# Patient Record
Sex: MALE | Race: OTHER
[De-identification: names, ages, dates, MRNs, and addresses within clinical notes are randomized per-mention and may not be internally consistent; named-entity substitution may affect disease eponyms.]

---

## 2017-02-02 ENCOUNTER — HOSPITAL ENCOUNTER (INPATIENT)
Dept: HOSPITAL 72 - EMR | Age: 77
LOS: 4 days | Discharge: HOME | DRG: 872 | End: 2017-02-06
Payer: MEDICARE

## 2017-02-02 VITALS — DIASTOLIC BLOOD PRESSURE: 66 MMHG | SYSTOLIC BLOOD PRESSURE: 144 MMHG

## 2017-02-02 VITALS — SYSTOLIC BLOOD PRESSURE: 110 MMHG | DIASTOLIC BLOOD PRESSURE: 45 MMHG

## 2017-02-02 VITALS — WEIGHT: 200 LBS | HEIGHT: 70 IN | BODY MASS INDEX: 28.63 KG/M2

## 2017-02-02 VITALS — DIASTOLIC BLOOD PRESSURE: 53 MMHG | SYSTOLIC BLOOD PRESSURE: 111 MMHG

## 2017-02-02 DIAGNOSIS — I25.118: ICD-10-CM

## 2017-02-02 DIAGNOSIS — J45.909: ICD-10-CM

## 2017-02-02 DIAGNOSIS — A41.81: Primary | ICD-10-CM

## 2017-02-02 DIAGNOSIS — Z95.5: ICD-10-CM

## 2017-02-02 DIAGNOSIS — B95.5: ICD-10-CM

## 2017-02-02 DIAGNOSIS — N39.0: ICD-10-CM

## 2017-02-02 DIAGNOSIS — R00.0: ICD-10-CM

## 2017-02-02 DIAGNOSIS — J44.1: ICD-10-CM

## 2017-02-02 DIAGNOSIS — E11.9: ICD-10-CM

## 2017-02-02 DIAGNOSIS — Z87.891: ICD-10-CM

## 2017-02-02 DIAGNOSIS — G47.33: ICD-10-CM

## 2017-02-02 DIAGNOSIS — E78.5: ICD-10-CM

## 2017-02-02 DIAGNOSIS — J10.1: ICD-10-CM

## 2017-02-02 DIAGNOSIS — J45.901: ICD-10-CM

## 2017-02-02 DIAGNOSIS — I10: ICD-10-CM

## 2017-02-02 LAB
ALBUMIN/GLOB SERPL: 1.7 {RATIO} (ref 1–2.7)
ALT SERPL-CCNC: 20 U/L (ref 3–41)
ANION GAP SERPL CALC-SCNC: 18 MMOL/L (ref 5–15)
APPEARANCE UR: CLEAR
AST SERPL-CCNC: 26 U/L (ref 5–40)
BACTERIA #/AREA URNS HPF: (no result) /HPF
BASOPHILS NFR BLD AUTO: 0.6 % (ref 0–2)
CALCIUM SERPL-MCNC: 9 MG/DL (ref 8.6–10.2)
CHLORIDE SERPL-SCNC: 95 MEQ/L (ref 98–107)
CK MB SERPL-MCNC: 1.7 NG/ML (ref ?–6.7)
CO2 SERPL-SCNC: 25 MEQ/L (ref 20–30)
CREAT SERPL-MCNC: 1.3 MG/DL (ref 0.7–1.2)
EOSINOPHIL NFR BLD AUTO: 0.7 % (ref 0–3)
ERYTHROCYTE [DISTWIDTH] IN BLOOD BY AUTOMATED COUNT: 12.9 % (ref 11.6–14.8)
GLOBULIN SER-MCNC: 2.5 G/DL
HEMOLYSIS: 61
KETONES UR QL STRIP: NEGATIVE
LEUKOCYTE ESTERASE UR QL STRIP: (no result)
LYMPHOCYTES NFR BLD AUTO: 7.7 % (ref 20–45)
MCH RBC QN AUTO: 26.2 PG (ref 27–31)
MCHC RBC AUTO-ENTMCNC: 31.3 G/DL (ref 32–36)
MCV RBC AUTO: 84 FL (ref 80–99)
MONOCYTES NFR BLD AUTO: 8.3 % (ref 1–10)
NEUTROPHILS NFR BLD AUTO: 82.7 % (ref 45–75)
NITRITE UR QL STRIP: NEGATIVE
PH UR STRIP: 6 [PH] (ref 4.5–8)
PLATELET # BLD: 156 K/UL (ref 150–450)
PMV BLD AUTO: 6.4 FL (ref 6.5–10.1)
POTASSIUM SERPL-SCNC: 4.2 MEQ/L (ref 3.4–4.9)
PROT SERPL-MCNC: 6.9 G/DL (ref 6.6–8.7)
PROT UR QL STRIP: NEGATIVE
RBC # BLD AUTO: 4.99 M/UL (ref 4.7–6.1)
RBC #/AREA URNS HPF: (no result) /HPF (ref 0–0)
SODIUM SERPL-SCNC: 138 MEQ/L (ref 135–145)
SP GR UR STRIP: 1.01 (ref 1–1.03)
SQUAMOUS #/AREA URNS LPF: (no result) /LPF
TROPONIN I SERPL-MCNC: < 0.3 NG/ML (ref ?–0.3)
UROBILINOGEN UR-MCNC: NORMAL MG/DL (ref 0–1)
WBC # BLD AUTO: 8.4 K/UL (ref 4.8–10.8)
WBC #/AREA URNS HPF: (no result) /HPF (ref 0–0)

## 2017-02-02 PROCEDURE — 82803 BLOOD GASES ANY COMBINATION: CPT

## 2017-02-02 PROCEDURE — 81003 URINALYSIS AUTO W/O SCOPE: CPT

## 2017-02-02 PROCEDURE — 36415 COLL VENOUS BLD VENIPUNCTURE: CPT

## 2017-02-02 PROCEDURE — 83880 ASSAY OF NATRIURETIC PEPTIDE: CPT

## 2017-02-02 PROCEDURE — 87086 URINE CULTURE/COLONY COUNT: CPT

## 2017-02-02 PROCEDURE — 86710 INFLUENZA VIRUS ANTIBODY: CPT

## 2017-02-02 PROCEDURE — 94664 DEMO&/EVAL PT USE INHALER: CPT

## 2017-02-02 PROCEDURE — 71010: CPT

## 2017-02-02 PROCEDURE — 93005 ELECTROCARDIOGRAM TRACING: CPT

## 2017-02-02 PROCEDURE — 82553 CREATINE MB FRACTION: CPT

## 2017-02-02 PROCEDURE — 87181 SC STD AGAR DILUTION PER AGT: CPT

## 2017-02-02 PROCEDURE — 36600 WITHDRAWAL OF ARTERIAL BLOOD: CPT

## 2017-02-02 PROCEDURE — 84484 ASSAY OF TROPONIN QUANT: CPT

## 2017-02-02 PROCEDURE — 87205 SMEAR GRAM STAIN: CPT

## 2017-02-02 PROCEDURE — 94640 AIRWAY INHALATION TREATMENT: CPT

## 2017-02-02 PROCEDURE — 94760 N-INVAS EAR/PLS OXIMETRY 1: CPT

## 2017-02-02 PROCEDURE — 87070 CULTURE OTHR SPECIMN AEROBIC: CPT

## 2017-02-02 PROCEDURE — 85025 COMPLETE CBC W/AUTO DIFF WBC: CPT

## 2017-02-02 PROCEDURE — 80053 COMPREHEN METABOLIC PANEL: CPT

## 2017-02-02 RX ADMIN — ALBUTEROL SULFATE SCH MG: 2.5 SOLUTION RESPIRATORY (INHALATION) at 20:09

## 2017-02-02 RX ADMIN — IPRATROPIUM BROMIDE AND ALBUTEROL SULFATE SCH ML: .5; 3 SOLUTION RESPIRATORY (INHALATION) at 23:26

## 2017-02-02 RX ADMIN — Medication SCH MCG: at 20:10

## 2017-02-02 RX ADMIN — ALBUTEROL SULFATE SCH MG: 2.5 SOLUTION RESPIRATORY (INHALATION) at 20:10

## 2017-02-02 RX ADMIN — Medication SCH MCG: at 20:09

## 2017-02-02 NOTE — EMERGENCY ROOM REPORT
History of Present Illness


General


Chief Complaint:  Dyspnea/Respdistress


Source:  Patient, EMS





Present Illness


HPI


76-year-old male presents to ED complaining of shortness of breath.  States 

symptoms started today.  Per EMS patient was wheezing.  Started on breathing 

treatments.  Patient has history of COPD.  Denies any fevers or chills.  Denies 

chest pain.  Denies sick contacts or recent travel.  No other aggravating 

relieving factors.  Denies any other associated symptoms


Allergies:  


Coded Allergies:  


     No Known Allergies (Unverified , 2/19/14)





Patient History


Past Medical History:  HTN, asthma, COPD


Past Surgical History:  none


Pertinent Family History:  none


Social History:  Denies: alcohol use, drug use, smoking


Immunizations:  UTD


Reviewed Nursing Documentation:  PMH: Agreed, PSxH: Agreed





Nursing Documentation-PMH


Past Medical History:  No History, Except For


Hx Cardiac Problems:  Yes


Hx Hypertension:  Yes


Hx Asthma:  Yes


Hx COPD:  Yes - EMPHYSEMA


Hx Cancer:  No


Hx Gastrointestinal Problems:  Yes


Hx Neurological Problems:  No





Review of Systems


All Other Systems:  negative except mentioned in HPI





Physical Exam





Vital Signs








  Date Time  Temp Pulse Resp B/P Pulse Ox O2 Delivery O2 Flow Rate FiO2


 


2/2/17 18:51  100 20 162/80 95 Room Air  


 


2/2/17 20:33 98.9       








Sp02 EP Interpretation:  reviewed, normal


General Appearance:  no apparent distress, alert, GCS 15, non-toxic


Head:  normocephalic


Eyes:  bilateral eye PERRL, bilateral eye normal inspection


ENT:  normal ENT inspection


Neck:  normal inspection


Respiratory:  wheezing


Cardiovascular #1:  regular rate, rhythm, no edema


Gastrointestinal:  normal bowel sounds, non tender, soft, non-distended, no 

guarding, no rebound


Rectal:  deferred


Genitourinary:  no CVA tenderness


Musculoskeletal:  normal inspection


Neurologic:  alert, oriented x3, responsive, motor strength/tone normal, 

sensory intact, speech normal


Psychiatric:  normal inspection


Skin:  normal inspection


Lymphatic:  normal inspection





Medical Decision Making


Diagnostic Impression:  


 Primary Impression:  


 COPD exacerbation


ER Course


Hospital Course 


76-year-old M presenting to ED with SOB.  h/o COPD





Differential diagnoses include: Pneumonia, CHF exacerbation, pneumothorax, 

fluid overload





Clinical course


Patient placed on stretcher.  On cardiac monitor with stable vitals.  After 

initial history and physical, I ordered nebulizer treatments.  I ordered labs, 

IV fluids, EKG, chest x-ray, blood cultures, UA.  





Labs - no leukocytosis noted, hemoglobin/hematocrit stable, electrolytes  okay, 

lactate okay, troponins negative 


CXR - hyperinflated lungs. no infiltrates 





Patient states he does not feel better and wishes to be admitted.  abx given





Case discussed with Dr. Bruno (covering for Carol) and he agreed to the 

patient to his service for further care and support





I feel this is a highly complex case requiring extensive working including EKG/

Rhythm strip, Xray/CT/US, Blood/urine lab work, repeat exams while in ED, and 

administration of strong opiates/narcotics for pain control, admission to 

hospital or close patient follow up.  





Diagnosis - COPD exacerbation 





Patient admitted to telemetry in serious condition





Labs








Test


  2/2/17


20:15


 


White Blood Count


  8.4 K/UL


(4.8-10.8)


 


Red Blood Count


  4.99 M/UL


(4.70-6.10)


 


Hemoglobin


  13.1 G/DL


(14.2-18.0)


 


Hematocrit


  41.8 %


(42.0-52.0)


 


Mean Corpuscular Volume 84 FL (80-99) 


 


Mean Corpuscular Hemoglobin


  26.2 PG


(27.0-31.0)


 


Mean Corpuscular Hemoglobin


Concent 31.3 G/DL


(32.0-36.0)


 


Red Cell Distribution Width


  12.9 %


(11.6-14.8)


 


Platelet Count


  156 K/UL


(150-450)


 


Mean Platelet Volume


  6.4 FL


(6.5-10.1)


 


Neutrophils (%) (Auto)


  82.7 %


(45.0-75.0)


 


Lymphocytes (%) (Auto)


  7.7 %


(20.0-45.0)


 


Monocytes (%) (Auto)


  8.3 %


(1.0-10.0)


 


Eosinophils (%) (Auto)


  0.7 %


(0.0-3.0)


 


Basophils (%) (Auto)


  0.6 %


(0.0-2.0)


 


Urine Color Pale yellow 


 


Urine Appearance Clear 


 


Urine pH 6 (4.5-8.0) 


 


Urine Specific Gravity


  1.010


(1.005-1.035)


 


Urine Protein


  Negative


(NEGATIVE)


 


Urine Glucose (UA)


  Negative


(NEGATIVE)


 


Urine Ketones


  Negative


(NEGATIVE)


 


Urine Occult Blood 4+ (NEGATIVE) 


 


Urine Nitrite


  Negative


(NEGATIVE)


 


Urine Bilirubin


  Negative


(NEGATIVE)


 


Urine Urobilinogen


  Normal MG/DL


(0.0-1.0)


 


Urine Leukocyte Esterase 3+ (NEGATIVE) 


 


Urine RBC


  2-4 /HPF (0 -


0)


 


Urine WBC


  40-60 /HPF (0


- 0)


 


Urine Squamous Epithelial


Cells None /LPF


(NONE/OCC)


 


Urine Bacteria


  Few /HPF


(NONE)


 


Sodium Level


  138 mEQ/L


(135-145)


 


Potassium Level


  4.2 mEQ/L


(3.4-4.9)


 


Chloride Level


  95 mEQ/L


()


 


Carbon Dioxide Level


  25 mEQ/L


(20-30)


 


Anion Gap 18 (5-15) 


 


Blood Urea Nitrogen


  14 mg/dL


(7-23)


 


Creatinine


  1.3 mg/dL


(0.7-1.2)


 


Estimat Glomerular Filtration


Rate  mL/min (>60) 


 


 


Glucose Level


  103 mg/dL


()


 


Calcium Level


  9.0 mg/dL


(8.6-10.2)


 


Total Bilirubin


  0.4 mg/dL


(0.0-1.2)


 


Aspartate Amino Transf


(AST/SGOT) 26 U/L (5-40) 


 


 


Alanine Aminotransferase


(ALT/SGPT) 20 U/L (3-41) 


 


 


Alkaline Phosphatase


  75 U/L


()


 


Creatine Kinase MB


  1.7 ng/mL (<


6.7)


 


Troponin I


  < 0.30 ng/mL


(<=0.30)


 


Pro-B-Type Natriuretic Peptide


  186 pg/mL


(0-450)


 


Total Protein


  6.9 g/dL


(6.6-8.7)


 


Albumin


  4.4 g/dL


(3.5-5.2)


 


Globulin 2.5 g/dL 


 


Albumin/Globulin Ratio 1.7 (1.0-2.7) 








EKG Diagnostic Results


Rate:  normal


Rhythm:  NSR


ST Segments:  no acute changes


ASA given to the pt in ED:  No





Rhythm Strip Diag. Results


EP Interpretation:  yes


Rhythm:  NSR, no PVC's, no ectopy





Chest X-Ray Diagnostic Results


EP Interpretation:  Yes


Findings:  no consolidation, no effusion, no pneumothorax, no acute 

cardiopulmonary disease, other - hyperinflated


Number of Views:  1





Last Vital Signs








  Date Time  Temp Pulse Resp B/P Pulse Ox O2 Delivery O2 Flow Rate FiO2


 


2/2/17 22:16  128 20 111/53 93 Room Air  


 


2/2/17 21:46 98.9       








Status:  unchanged


Disposition:  ADMITTED AS INPATIENT


Condition:  Serious


Referrals:  


MELBA BIRMINGHAM (PCP)











GRETTA LEAL M.D. Feb 2, 2017 22:40

## 2017-02-03 VITALS — SYSTOLIC BLOOD PRESSURE: 109 MMHG | DIASTOLIC BLOOD PRESSURE: 51 MMHG

## 2017-02-03 VITALS — SYSTOLIC BLOOD PRESSURE: 114 MMHG | DIASTOLIC BLOOD PRESSURE: 75 MMHG

## 2017-02-03 VITALS — SYSTOLIC BLOOD PRESSURE: 137 MMHG | DIASTOLIC BLOOD PRESSURE: 75 MMHG

## 2017-02-03 VITALS — DIASTOLIC BLOOD PRESSURE: 70 MMHG | SYSTOLIC BLOOD PRESSURE: 138 MMHG

## 2017-02-03 LAB
ARTERIAL PATENCY WRIST A: POSITIVE
BASE EXCESS STD BLDA CALC-SCNC: -2.4 MMOL/L
PCO2 BLDA: 36.4 MMHG (ref 35–45)

## 2017-02-03 RX ADMIN — ZOLPIDEM TARTRATE PRN MG: 5 TABLET ORAL at 01:06

## 2017-02-03 RX ADMIN — OSELTAMIVIR PHOSPHATE SCH MG: 75 CAPSULE ORAL at 17:19

## 2017-02-03 RX ADMIN — ATORVASTATIN CALCIUM SCH MG: 20 TABLET, FILM COATED ORAL at 21:25

## 2017-02-03 RX ADMIN — SODIUM CHLORIDE SCH MLS/HR: 0.9 INJECTION INTRAVENOUS at 00:26

## 2017-02-03 RX ADMIN — METHYLPREDNISOLONE SODIUM SUCCINATE SCH MG: 125 INJECTION, POWDER, FOR SOLUTION INTRAMUSCULAR; INTRAVENOUS at 05:36

## 2017-02-03 RX ADMIN — OSELTAMIVIR PHOSPHATE SCH MG: 75 CAPSULE ORAL at 10:07

## 2017-02-03 RX ADMIN — METHYLPREDNISOLONE SODIUM SUCCINATE SCH MG: 125 INJECTION, POWDER, FOR SOLUTION INTRAMUSCULAR; INTRAVENOUS at 13:30

## 2017-02-03 RX ADMIN — HEPARIN SODIUM SCH UNITS: 5000 INJECTION INTRAVENOUS; SUBCUTANEOUS at 21:30

## 2017-02-03 RX ADMIN — METHYLPREDNISOLONE SODIUM SUCCINATE SCH MG: 125 INJECTION, POWDER, FOR SOLUTION INTRAMUSCULAR; INTRAVENOUS at 21:24

## 2017-02-03 RX ADMIN — LOSARTAN POTASSIUM AND HYDROCHLOROTHIAZIDE SCH TAB: 12.5; 5 TABLET ORAL at 09:11

## 2017-02-03 RX ADMIN — IPRATROPIUM BROMIDE AND ALBUTEROL SULFATE SCH ML: .5; 3 SOLUTION RESPIRATORY (INHALATION) at 11:34

## 2017-02-03 RX ADMIN — IPRATROPIUM BROMIDE AND ALBUTEROL SULFATE SCH ML: .5; 3 SOLUTION RESPIRATORY (INHALATION) at 02:08

## 2017-02-03 RX ADMIN — IPRATROPIUM BROMIDE AND ALBUTEROL SULFATE SCH ML: .5; 3 SOLUTION RESPIRATORY (INHALATION) at 07:45

## 2017-02-03 RX ADMIN — IPRATROPIUM BROMIDE AND ALBUTEROL SULFATE SCH ML: .5; 3 SOLUTION RESPIRATORY (INHALATION) at 16:07

## 2017-02-03 RX ADMIN — HEPARIN SODIUM SCH UNITS: 5000 INJECTION INTRAVENOUS; SUBCUTANEOUS at 09:14

## 2017-02-03 RX ADMIN — FLUTICASONE PROPIONATE AND SALMETEROL SCH PUFFS: 50; 250 POWDER RESPIRATORY (INHALATION) at 19:18

## 2017-02-03 RX ADMIN — IPRATROPIUM BROMIDE AND ALBUTEROL SULFATE SCH ML: .5; 3 SOLUTION RESPIRATORY (INHALATION) at 23:41

## 2017-02-03 RX ADMIN — IPRATROPIUM BROMIDE AND ALBUTEROL SULFATE SCH ML: .5; 3 SOLUTION RESPIRATORY (INHALATION) at 19:18

## 2017-02-03 NOTE — DIAGNOSTIC IMAGING REPORT
Indication: SOB



Technique: One view of the chest



Comparison: 10/18/2016



Findings: There is atelectasis or scarring at the left lung base. Heart size is

normal. Aorta is tortuous and calcified.



Impression: No acute process



This agrees with the preliminary interpretation provided by the emergency room

physician

## 2017-02-03 NOTE — CONSULTATION
DATE OF CONSULTATION:



PULMONARY CONSULTATION



This dictation is for coverage for Dr. Joe Arthur.



CONSULTING PHYSICIAN:  Chan Ramos M.D.



REFERRING PHYSICIAN:  Austin Bruno M.D.



HISTORY OF PRESENT ILLNESS:  The patient is a 76-year-old male former

smoker with a history of chronic obstructive pulmonary disease,

hypertension, CAD, prior myocardial infarction, hyperlipidemia, diabetes,

CHINMAY, and noncompliance, who presented to the ER overnight with complaints

of shortness of breath and wheezing for a day.  He was given breathing

treatments in the ER and admitted for chronic obstructive pulmonary

disease exacerbation.  His rapid flu came back as positive for influenza A

and he has been started on Tamiflu.  The patient has a coarse cough with

difficulty mobilizing, associated wheezing, and some rhinorrhea.  No

congestion.  No fevers or chills.  No nausea, vomiting, diarrhea,

constipation, abdominal pain, or urinary complaints.



PAST MEDICAL HISTORY:

1. Chronic obstructive pulmonary disease.

2. CHINMAY.

3. CAD status post PCI.

4. Hypertension.

5. Hyperlipidemia.

6. Diabetes.

7. History of noncompliance with treatment.



MEDICATIONS:  Prior to admission medications, albuterol, Xanax,

captopril, Restasis, Nexium, Lasix, losartan, hydrochlorothiazide,

lorazepam, Reglan, Crestor, Advair, VESIcare, and Ambien.



ALLERGIES:  No known drug allergies.



SOCIAL HISTORY:  He is a former smoker.  No tobacco, alcohol, or drug

use.  He is retired.  He has good family support.



FAMILY HISTORY:  Noncontributory.



REVIEW OF SYSTEMS:  Negative other than history of present illness.



PHYSICAL EXAMINATION:

VITAL SIGNS:  Temperature 98.2 degrees, pulse 100, blood pressure

140/79, respiratory rate 20, and saturating 92% on two liters. liters.

GENERAL:  This is an elderly male, in no acute distress.  Awake,

alert, and oriented x3.

HEENT:  Normocephalic and atraumatic.  Oropharynx is clear with moist

mucous membranes.

NECK:  Supple without lymphadenopathy or jugular venous distention.

CHEST:  Scattered coarse breath sounds with rhonchi and faint

end-expiratory wheezing.

HEART:  Regular rate and rhythm.

ABDOMEN:  Soft and nontender.

EXTREMITIES:  No cyanosis, clubbing, or edema.



ANCILLARY DATA:  Hemoglobin 8, hematocrit 13.1, and platelet count

156,000.  Sodium 138, potassium 4.2, chloride 95, bicarbonate 25, BUN 14,

creatinine 1.3, glucose 103, and calcium 9.0.  Total bilirubin 0.4.  AST

26, ALT 26, and alkaline phosphatase 75.  Troponin negative.  .

Total protein 6.9.  Albumin 4.9.  Globulin 2.5.  Urinalysis, 4+ blood and

3+ leukocyte esterase.  Urine culture pending.  Rapid influenza positive

for influenza A.  Chest x-ray is not available for review, but per report,

hyperinflation.  No infiltrates.



ASSESSMENT:  The patient is a 76-year-old male former smoker with a

history of chronic obstructive pulmonary disease, coronary artery disease

status post percutaneous coronary intervention, hypertension,

hyperlipidemia, chronic kidney disease, diabetes, untreated obstructive

sleep apnea, and medication noncompliance, presenting with an exacerbation

of his chronic obstructive pulmonary disease likely brought by an

influenza A upper respiratory infection/pneumonitis.



PROBLEM LIST:

1. Influenza A upper respiratory infection/pneumonitis.

2. Chronic obstructive pulmonary disease with acute exacerbation.

3. Urinary tract infection.

4. Coronary artery disease status post percutaneous coronary

intervention.

5. Hypertension.

6. Hyperlipidemia.

7. Diabetes.

8. Obstructive sleep apnea, untreated.

9. History of noncompliance.

10. Former tobacco use.



TREATMENT PLAN:

1. Optimize pulmonary hygiene and mobilize as tolerated.

2. Continue Advair.

3. Round-the-clock and p.r.n. DuoNebs.

4. Solu-Medrol 60 IV t.i.d. and taper based on response.

5. Tamiflu (today is day #1).

6. Levaquin (today is day #2).

7. Follow sputum culture and urine culture.

8. Monitor volumes/continue p.o. Lasix.

9. Heparin subcutaneous for DVT prophylaxis.



Dr. Bruno, thank you for allowing us to assist in the care of your

patient.  If we may be of any assistance in the future, please do not

hesitate to ask.









  ______________________________________________

  Chan Ramos M.D. DR:  KRISTI

D:  02/03/2017 13:02

T:  02/03/2017 18:03

JOB#:  [id number]

CC:

## 2017-02-03 NOTE — HISTORY AND PHYSICAL REPORT
DATE OF ADMISSION:  02/02/2017



CHIEF COMPLAINT:  Chronic obstructive pulmonary disease

exacerbation.



HISTORY OF PRESENT ILLNESS:  The patient is an 76-year-old male.  He

has a history of chronic obstructive pulmonary disease, asthma, chronic

bronchitis, hypertension who presented from home with complaints of one

week of continued shortness of breath.  On evaluation in the emergency

room, the patient was diagnosed with chronic obstructive pulmonary disease

exacerbation.  He was initially on BiPAP.  He received multiple breathing

treatments as well steroids but did not improve significantly.  He is

therefore admitted for further evaluation and care.



PAST MEDICAL HISTORY:  As above.



PAST SURGICAL HISTORY:  None.



CURRENT MEDICATIONS:  Reconciled and reviewed.



ALLERGIES:  None.



SOCIAL HISTORY:  The patient is a prior smoker but quit.  He denies

alcohol or drugs.



FAMILY HISTORY:  None.



REVIEW OF SYSTEMS:  General:  No fever or chills.    HEENT:  No

headaches or visual changes.  Cardiopulmonary:  No chest pain.  Positive

shortness of breath.  Gastrointestinal:  No nausea or vomiting.

Genitourinary:  No urgency or frequency.    Musculoskeletal:  No dependent

swelling.    Neurologic:  No history of seizures.



PHYSICAL EXAMINATION:

VITAL SIGNS:  Temperature 98.2 degrees, blood pressure 114/75, pulse

of 103, respirations 20, O2 saturation 92% on two liters nasal cannula.

 

GENERAL:  The patient is a well-developed male, in no apparent

distress. He is awake and alert, appears to be in no distress.

HEENT:  Oropharynx is clear.

NECK:  Supple.

HEART:  Regular rate and rhythm.

LUNGS:  Significant for __________ breath sounds with scattered

wheezes.

ABDOMEN:  Soft, nontender, and nondistended.

EXTREMITIES:  Without clubbing, cyanosis, or edema.



LABORATORY AND DIAGNOSTIC DATA:  White count of 8, hemoglobin 13,

hematocrit 41, and platelets of 156,000.  Sodium 138, creatinine was 1.3.

Urine showed 40 to 60 WBCs. Influenza A is positive.



ASSESSMENT:  This is a pleasant male admitted with complaints of

chronic obstructive pulmonary disease exacerbation.  He also has influenza

and urinary tract infection.



PLAN:

1. Intravenous steroids.

2. Respiratory treatments.

3. Pulmonary and ID consultation.

4. Start Tamiflu for influenza.

5. IV antibiotics for urinary tract infection.

6. Followup cultures.









  ______________________________________________

  Austin Bruno M.D.





DR:  Bekah

D:  02/03/2017 09:43

T:  02/03/2017 15:15

JOB#:  [id number]

CC:

## 2017-02-04 VITALS — SYSTOLIC BLOOD PRESSURE: 130 MMHG | DIASTOLIC BLOOD PRESSURE: 69 MMHG

## 2017-02-04 VITALS — DIASTOLIC BLOOD PRESSURE: 69 MMHG | SYSTOLIC BLOOD PRESSURE: 125 MMHG

## 2017-02-04 VITALS — DIASTOLIC BLOOD PRESSURE: 78 MMHG | SYSTOLIC BLOOD PRESSURE: 154 MMHG

## 2017-02-04 VITALS — DIASTOLIC BLOOD PRESSURE: 70 MMHG | SYSTOLIC BLOOD PRESSURE: 129 MMHG

## 2017-02-04 VITALS — SYSTOLIC BLOOD PRESSURE: 135 MMHG | DIASTOLIC BLOOD PRESSURE: 77 MMHG

## 2017-02-04 RX ADMIN — HEPARIN SODIUM SCH UNITS: 5000 INJECTION INTRAVENOUS; SUBCUTANEOUS at 09:10

## 2017-02-04 RX ADMIN — IPRATROPIUM BROMIDE AND ALBUTEROL SULFATE SCH ML: .5; 3 SOLUTION RESPIRATORY (INHALATION) at 23:12

## 2017-02-04 RX ADMIN — IPRATROPIUM BROMIDE AND ALBUTEROL SULFATE SCH ML: .5; 3 SOLUTION RESPIRATORY (INHALATION) at 15:11

## 2017-02-04 RX ADMIN — IPRATROPIUM BROMIDE AND ALBUTEROL SULFATE SCH ML: .5; 3 SOLUTION RESPIRATORY (INHALATION) at 20:25

## 2017-02-04 RX ADMIN — PREDNISONE SCH MG: 20 TABLET ORAL at 09:07

## 2017-02-04 RX ADMIN — DIPHENHYDRAMINE HYDROCHLORIDE PRN MG: 50 INJECTION INTRAMUSCULAR; INTRAVENOUS at 18:32

## 2017-02-04 RX ADMIN — IPRATROPIUM BROMIDE AND ALBUTEROL SULFATE SCH ML: .5; 3 SOLUTION RESPIRATORY (INHALATION) at 03:41

## 2017-02-04 RX ADMIN — IPRATROPIUM BROMIDE AND ALBUTEROL SULFATE SCH ML: .5; 3 SOLUTION RESPIRATORY (INHALATION) at 11:30

## 2017-02-04 RX ADMIN — DOCUSATE SODIUM SCH MG: 100 CAPSULE, LIQUID FILLED ORAL at 09:08

## 2017-02-04 RX ADMIN — DOCUSATE SODIUM SCH MG: 100 CAPSULE, LIQUID FILLED ORAL at 18:17

## 2017-02-04 RX ADMIN — SODIUM CHLORIDE SCH MLS/HR: 0.9 INJECTION INTRAVENOUS at 00:58

## 2017-02-04 RX ADMIN — FLUTICASONE PROPIONATE AND SALMETEROL SCH PUFFS: 50; 250 POWDER RESPIRATORY (INHALATION) at 20:25

## 2017-02-04 RX ADMIN — LOSARTAN POTASSIUM AND HYDROCHLOROTHIAZIDE SCH TAB: 12.5; 5 TABLET ORAL at 09:08

## 2017-02-04 RX ADMIN — HEPARIN SODIUM SCH UNITS: 5000 INJECTION INTRAVENOUS; SUBCUTANEOUS at 21:21

## 2017-02-04 RX ADMIN — ASPIRIN SCH MG: 81 TABLET, DELAYED RELEASE ORAL at 09:09

## 2017-02-04 RX ADMIN — OSELTAMIVIR PHOSPHATE SCH MG: 75 CAPSULE ORAL at 09:08

## 2017-02-04 RX ADMIN — OSELTAMIVIR PHOSPHATE SCH MG: 75 CAPSULE ORAL at 18:17

## 2017-02-04 RX ADMIN — ZOLPIDEM TARTRATE PRN MG: 5 TABLET ORAL at 00:58

## 2017-02-04 RX ADMIN — ATORVASTATIN CALCIUM SCH MG: 20 TABLET, FILM COATED ORAL at 21:21

## 2017-02-04 RX ADMIN — IPRATROPIUM BROMIDE AND ALBUTEROL SULFATE SCH ML: .5; 3 SOLUTION RESPIRATORY (INHALATION) at 07:30

## 2017-02-04 NOTE — PULMONOLOGY PROGRESS NOTE
Assessment/Plan


Assessment/Plan


PROBLEM LIST:


1. Influenza A upper respiratory infection/pneumonitis.


2. Chronic obstructive pulmonary disease with acute exacerbation.


3. Urinary tract infection.


4. Coronary artery disease status post percutaneous coronary


intervention.


5. Hypertension.


6. Hyperlipidemia.


7. Diabetes.


8. Obstructive sleep apnea, untreated.


9. History of noncompliance.


10. Former tobacco use.





TREATMENT PLAN:


1. Optimize pulmonary hygiene and mobilize as tolerated.


2. Continue Advair.


3. Round-the-clock and p.r.n. DuoNebs.


4. Solu-Medrol 60 IV t.i.d. and taper in am to q 12 if stable


5. Tamiflu 


6. Levaquin 


7. Follow sputum culture and urine culture.


8. Monitor volumes/continue p.o. Lasix.


9. Heparin subcutaneous for DVT prophylaxis.





Subjective


Constitutional:  Reports: no symptoms


HEENT:  Repors: no symptoms


Respiratory:  Reports: productive cough, shortness of breath


Cardiovascular:  Reports: no symptoms


Gastrointestinal/Abdominal:  Reports: no symptoms


Genitourinary:  Reports: no symptoms


Skin:  Reports: no symptoms


Hematologic:  Reports: no symptoms


Allergies:  


Coded Allergies:  


     No Known Allergies (Unverified , 2/19/14)


Subjective


still with sob


no bleeding


on o2


cough with out phelgm


oob but weak


tolerating some po





Objective





Last 24 Hour Vital Signs








  Date Time  Temp Pulse Resp B/P Pulse Ox O2 Delivery O2 Flow Rate FiO2


 


2/4/17 19:12  76 18  99 Nasal Cannula 2.0 28 2/4/17 19:05  76 18  92 Room Air  


 


2/4/17 19:05     92 Room Air  21


 


2/4/17 19:05      Room Air  


 


2/4/17 16:00 97.7 86 18 129/70 98 Room Air  


 


2/4/17 12:00 97.9 76 20 125/69 100 Simple Mask  


 


2/4/17 11:35  80 20  99 Nasal Cannula 2.0 28 2/4/17 11:30  77 18  99 Nasal Cannula 2.0 28 2/4/17 09:08    135/77    


 


2/4/17 08:00 97.0 75 20 135/77 95 Room Air  


 


2/4/17 08:00  79      


 


2/4/17 07:35  78 20  99 Room Air  


 


2/4/17 07:30      Nasal Cannula 2.0 28 2/4/17 07:30     95 Room Air  21


 


2/4/17 07:30  75 18  95 Room Air  


 


2/4/17 04:30 98.0 80 20 154/78 99 Nasal Cannula 2.0 


 


2/4/17 03:59  79      


 


2/4/17 03:30  81 18  99 Nasal Cannula 3.0 


 


2/4/17 03:30  80 18  97 Nasal Cannula 3.0 


 


2/3/17 23:42  87      


 


2/3/17 23:25  84 18  99 Nasal Cannula 3.0 


 


2/3/17 23:25  89 18  96 Nasal Cannula 3.0 

















Intake and Output  


 


 2/3/17 2/4/17





 19:00 07:00


 


Intake Total 320 ml 55 ml


 


Balance 320 ml 55 ml


 


  


 


Intake Oral 120 ml 


 


IV Total  55 ml


 


Other 200 ml 


 


# Voids 3 2








General Appearance:  WD/WN


HEENT:  PERRL


Respiratory/Chest:  rhonchi


Cardiovascular:  normal rate, regular rhythm


Abdomen:  soft, non tender, no organomegaly


Extremities:  no cyanosis


Skin:  no rash





Microbiology








 Date/Time


Source Procedure


Growth Status


 


 


 2/3/17 05:15


Nasal Nares Influenza Types A,B Antigen (GUANACO) - Final Complete


 


 2/2/17 20:15


Urine,Clean Catch Urine Culture - Preliminary


Strep Species, Gamma-Hemolytic Resulted











Current Medications








 Medications


  (Trade)  Dose


 Ordered  Sig/Kyara


 Route


 PRN Reason  Start Time


 Stop Time Status Last Admin


Dose Admin


 


 Acetaminophen


  (Tylenol)  500 mg  Q4H  PRN


 ORAL


 Mild Pain/Temp > 100.5  2/2/17 22:00


 3/4/17 21:59   


 


 


 Albuterol/


 Ipratropium 3 ml  3 ml  Q4HRT


 HHN


   2/2/17 23:00


 2/7/17 22:59  2/4/17 20:25


 


 


 Alprazolam


  (Xanax)  0.5 mg  DAILY  PRN


 ORAL


 Agitation  2/2/17 22:00


 2/9/17 21:59   


 


 


 Aspirin


  (Ecotrin)  81 mg  DAILY


 ORAL


   2/4/17 09:00


 3/6/17 08:59  2/4/17 09:09


 


 


 Atorvastatin


 Calcium


  (Lipitor)  20 mg  BEDTIME


 ORAL


   2/3/17 21:00


 3/5/17 20:59  2/3/17 21:25


 


 


 Ceftriaxone


 Sodium/Dextrose


  (Rocephin/D5W)  55 ml @ 


 110 mls/hr  Q24H


 IVPB


   2/3/17 00:00


 2/10/17 00:00  2/4/17 00:58


 


 


 Diphenhydramine


 HCl


  (Benadryl)  25 mg  EVERY 4 HOURS  PRN


 IVP


 Itching  2/4/17 19:00


 3/6/17 18:59  2/4/17 18:32


 


 


 Docusate Sodium


  (Colace)  100 mg  TWICE A  DAY


 ORAL


   2/4/17 09:00


 3/6/17 08:59  2/4/17 18:17


 


 


 HCTZ/Losartan


 Potassium


  (Hyzaar 50-12.5)  1 tab  DAILY


 ORAL


   2/3/17 09:00


 3/5/17 08:59  2/4/17 09:08


 


 


 Heparin Sodium


  (Porcine)


  (Heparin 5000


 units/ml)  5,000 units  EVERY 12  HOURS


 SUBQ


   2/3/17 09:00


 3/5/17 08:59  2/4/17 09:10


 


 


 Metoclopramide HCl


  (Reglan)  10 mg  Q6H  PRN


 ORAL


 Nausea & Vomiting  2/2/17 22:00


 3/4/17 21:59   


 


 


 Oseltamivir


 Phosphate


  (Tamiflu)  75 mg  TWICE A  DAY


 ORAL


   2/3/17 10:30


 2/8/17 10:29  2/4/17 18:17


 


 


 Pantoprazole


  (Protonix)  40 mg  ACBREAKFAST


 ORAL


   2/4/17 06:30


 3/5/17 08:59  2/4/17 07:00


 


 


 Polyethylene


 Glycol


  (Miralax)  17 gm  DAILYPRN  PRN


 ORAL


 Constipation  2/4/17 07:00


 3/6/17 06:59   


 


 


 Prednisone


  (predniSONE)  30 mg  DAILY


 ORAL


   2/4/17 09:00


 3/6/17 08:59  2/4/17 09:07


 


 


 Promethazine HCl/


 Codeine


  (Phenergan with


 Codeine)  10 ml  TIDPRN  PRN


 ORAL


 For Cough  2/4/17 07:00


 3/6/17 06:59   


 


 


 Salmeterol


 Xinafoate/


 Fluticasone


  (Advair 250/50


 Diskus)  1 puffs  BEDTIME


 INH


   2/3/17 21:00


 3/5/17 20:59  2/4/17 20:25


 


 


 Solifenacin


  (Vesicare)  5 mg  BEDTIME


 ORAL


   2/3/17 21:00


 3/5/17 20:59  2/3/17 21:25


 


 


 Zolpidem Tartrate


  (Ambien)  5 mg  BEDTIME  PRN


 ORAL


 Insomnia  2/2/17 22:00


 3/4/17 21:59  2/4/17 00:58


 











Current Medications








 Medications


  (Trade)  Dose


 Ordered  Sig/Kyara


 Route


 PRN Reason  Start Time


 Stop Time Status Last Admin


Dose Admin


 


 Acetaminophen


  (Tylenol)  500 mg  Q4H  PRN


 ORAL


 Mild Pain/Temp > 100.5  2/2/17 22:00


 3/4/17 21:59   


 


 


 Albuterol/


 Ipratropium 3 ml  3 ml  Q4HRT


 HHN


   2/2/17 23:00


 2/7/17 22:59  2/4/17 20:25


 


 


 Alprazolam


  (Xanax)  0.5 mg  DAILY  PRN


 ORAL


 Agitation  2/2/17 22:00


 2/9/17 21:59   


 


 


 Aspirin


  (Ecotrin)  81 mg  DAILY


 ORAL


   2/4/17 09:00


 3/6/17 08:59  2/4/17 09:09


 


 


 Atorvastatin


 Calcium


  (Lipitor)  20 mg  BEDTIME


 ORAL


   2/3/17 21:00


 3/5/17 20:59  2/3/17 21:25


 


 


 Ceftriaxone


 Sodium/Dextrose


  (Rocephin/D5W)  55 ml @ 


 110 mls/hr  Q24H


 IVPB


   2/3/17 00:00


 2/10/17 00:00  2/4/17 00:58


 


 


 Diphenhydramine


 HCl


  (Benadryl)  25 mg  EVERY 4 HOURS  PRN


 IVP


 Itching  2/4/17 19:00


 3/6/17 18:59  2/4/17 18:32


 


 


 Docusate Sodium


  (Colace)  100 mg  TWICE A  DAY


 ORAL


   2/4/17 09:00


 3/6/17 08:59  2/4/17 18:17


 


 


 HCTZ/Losartan


 Potassium


  (Hyzaar 50-12.5)  1 tab  DAILY


 ORAL


   2/3/17 09:00


 3/5/17 08:59  2/4/17 09:08


 


 


 Heparin Sodium


  (Porcine)


  (Heparin 5000


 units/ml)  5,000 units  EVERY 12  HOURS


 SUBQ


   2/3/17 09:00


 3/5/17 08:59  2/4/17 09:10


 


 


 Metoclopramide HCl


  (Reglan)  10 mg  Q6H  PRN


 ORAL


 Nausea & Vomiting  2/2/17 22:00


 3/4/17 21:59   


 


 


 Oseltamivir


 Phosphate


  (Tamiflu)  75 mg  TWICE A  DAY


 ORAL


   2/3/17 10:30


 2/8/17 10:29  2/4/17 18:17


 


 


 Pantoprazole


  (Protonix)  40 mg  ACBREAKFAST


 ORAL


   2/4/17 06:30


 3/5/17 08:59  2/4/17 07:00


 


 


 Polyethylene


 Glycol


  (Miralax)  17 gm  DAILYPRN  PRN


 ORAL


 Constipation  2/4/17 07:00


 3/6/17 06:59   


 


 


 Prednisone


  (predniSONE)  30 mg  DAILY


 ORAL


   2/4/17 09:00


 3/6/17 08:59  2/4/17 09:07


 


 


 Promethazine HCl/


 Codeine


  (Phenergan with


 Codeine)  10 ml  TIDPRN  PRN


 ORAL


 For Cough  2/4/17 07:00


 3/6/17 06:59   


 


 


 Salmeterol


 Xinafoate/


 Fluticasone


  (Advair 250/50


 Diskus)  1 puffs  BEDTIME


 INH


   2/3/17 21:00


 3/5/17 20:59  2/4/17 20:25


 


 


 Solifenacin


  (Vesicare)  5 mg  BEDTIME


 ORAL


   2/3/17 21:00


 3/5/17 20:59  2/3/17 21:25


 


 


 Zolpidem Tartrate


  (Ambien)  5 mg  BEDTIME  PRN


 ORAL


 Insomnia  2/2/17 22:00


 3/4/17 21:59  2/4/17 00:58


 

















ISAI GOMEZ DO Feb 4, 2017 20:58

## 2017-02-04 NOTE — CONSULTATION
DATE OF CONSULTATION:



CARDIOLOGY CONSULTATION:



REQUESTING PHYSICIAN:  Austin Bruno M.D.



REASON FOR CONSULTATION:  Tachycardia in the setting of coronary

artery disease _________ .



HISTORY OF PRESENT ILLNESS:  The patient is a 76-year-old male who

presented to the emergency room last evening with shortness of breath.  He

has a known history of COPD.  He was taking breathing treatments at home,

but did not improve.  He has had a  moderate amount of congestion as well

as body aches and fever.



PAST MEDICAL HISTORY:  Hypertension, COPD, coronary artery disease,

and history of coronary stent.



MEDICATIONS:  Prior to admission, reviewed and reconciled.



ALLERGIES:  None known.



SOCIAL HISTORY:  Denies smoking or alcohol use.



REVIEW OF SYSTEMS:  Denies leg swelling.  Denies chest pain.  Denies

history of irregular heartbeats or endocarditis.  No known history of

blood clotting.



PHYSICAL EXAMINATION:

VITAL SIGNS:  Afebrile, blood pressure 111/53, pulse 128, and

respiratory rate 20.

HEENT:  Conjunctivae are pink.  Sclerae are anicteric.  Oropharynx

clear.

NECK:  Supple.  Accessory muscle use noted.

LUNGS:  With bilateral breath sounds.  Scattered rhonchi.  Expiratory

wheezes.

CARDIAC:  Regular rhythm.  Rapid rate.  Normal S1, S2 with a fourth

heart sound.

ABDOMEN:  Soft and nontender.

EXTREMITIES:  Good capillary refill.  No edema.



LABORATORY AND DIAGNOSTIC DATA:  Pro-natriuretic peptide 186.  BUN

14,  creatinine 1.3, sodium 138, potassium 4.2, and bicarbonate 25.

Urinalysis with 40 to 60 white cells.  White blood count is 8.4 and

hemoglobin 13.1.  EKG with sinus rhythm and no acute abnormalities.  Chest

x-ray with  hyperinflation, but no acute process.



IMPRESSION:

1. Chronic obstructive pulmonary disease exacerbation.

2. Acute bronchospasm.

3. Influenza A infection.

4. Secondary sinus tachycardia.

5. Ischemic heart disease with history of coronary stents and chronic

stable angina.

6. Urinary tract infection.



PLAN:  Cardiac monitoring.  Respiratory isolation.  Tamiflu.  IV

fluids with caution. Inhaled bronchodilators.  Intravenous steroids. DVT

prophylaxis with subcutaneous heparin. Anti-platelet therapy with aspirin.

Statin drug for LDL goal less than 100.  Hold diuretics at this time.

Maintain baseline antihypertensive regimen with losartan.









  ______________________________________________

  David Richards M.D.





DR:  Alireza

D:  02/03/2017 23:27

T:  02/04/2017 01:36

JOB#:  [id number]

CC:

## 2017-02-04 NOTE — GENERAL PROGRESS NOTE
Assessment/Plan


Problem List:  


(1) Constipation


ICD Codes:  K59.00 - Constipation, unspecified


SNOMED:  57747443


(2) Influenza A


ICD Codes:  J10.1 - Influenza due to other identified influenza virus with 

other respiratory manifestations


SNOMED:  025762683


(3) COPD (chronic obstructive pulmonary disease)


ICD Codes:  J44.9 - Chronic obstructive pulmonary disease, unspecified


SNOMED:  78861800


(4) Asthma exacerbation


ICD Codes:  J45.901 - Unspecified asthma with (acute) exacerbation


SNOMED:  777694634


Status:  stable, progressing


Assessment/Plan


tamiflu


dc iv steroids- Po


iv abx


laxatives


dc planning next 24-48hrs if continued improvement





Subjective


ROS Limited/Unobtainable:  No


Constitutional:  Reports: no symptoms


HEENT:  Reports: no symptoms


Cardiovascular:  Reports: no symptoms


Respiratory:  Reports: cough


Gastrointestinal/Abdominal:  Reports: constipated


Genitourinary:  Reports: no symptoms


Neurologic/Psychiatric:  Reports: no symptoms


Endocrine:  Reports: no symptoms


Hematologic/Lymphatic:  Reports: no symptoms


Allergies:  


Coded Allergies:  


     No Known Allergies (Unverified , 2/19/14)


All Systems:  reviewed and negative except above


Subjective


c/o cough. no sob. no chest pain


no bm x 3 days.





Objective





Last 24 Hour Vital Signs








  Date Time  Temp Pulse Resp B/P Pulse Ox O2 Delivery O2 Flow Rate FiO2


 


2/4/17 04:30 98.0 80 20 154/78 99 Nasal Cannula 2.0 


 


2/4/17 03:30  81 18  99 Nasal Cannula 3.0 


 


2/4/17 03:30  80 18  97 Nasal Cannula 3.0 


 


2/3/17 23:42  87      


 


2/3/17 23:25  84 18  99 Nasal Cannula 3.0 


 


2/3/17 23:25  89 18  96 Nasal Cannula 3.0 


 


2/3/17 20:17 98.4 95 20 138/70 97 Room Air  


 


2/3/17 20:00  107      


 


2/3/17 19:21  93 18  98 Nasal Cannula 3.0 


 


2/3/17 19:20  93 18  98 Nasal Cannula 3.0 


 


2/3/17 19:20     98 Nasal Cannula 2.0 


 


2/3/17 19:20      Nasal Cannula 2.0 


 


2/3/17 16:43 98.2 90 20 137/75 97 Nasal Cannula 3.0 


 


2/3/17 16:00  92      


 


2/3/17 15:50  94 20  97 Nasal Cannula 3.0 


 


2/3/17 15:40  91 18  95 Nasal Cannula 3.0 


 


2/3/17 11:42  97 20  96 Nasal Cannula 3.0 


 


2/3/17 11:28  94 20  92 Nasal Cannula 2.0 


 


2/3/17 09:11    143/79    


 


2/3/17 08:07  101      


 


2/3/17 07:47  96 20  94 Nasal Cannula 2.0 


 


2/3/17 07:40      Nasal Cannula 2.0 


 


2/3/17 07:39     93 Nasal Cannula 2.0 


 


2/3/17 07:37  93 20  92 Nasal Cannula 2.0 

















Intake and Output  


 


 2/3/17 2/4/17





 19:00 07:00


 


Intake Total 320 ml 


 


Balance 320 ml 


 


  


 


Intake Oral 120 ml 


 


Other 200 ml 


 


# Voids 3 2








Laboratory Tests


2/3/17 12:49: 


Arterial Blood pH 7.390, Arterial Blood Partial Pressure CO2 36.4, Arterial 

Blood Partial Pressure O2 76.5, Arterial Blood HCO3 21.9L, Arterial Blood 

Oxygen Saturation 94.9, Arterial Blood Base Excess -2.4, Tray Test Positive


Height (Feet):  5


Height (Inches):  10.00


Weight (Pounds):  200


General Appearance:  WD/WN, alert


Neck:  supple


Cardiovascular:  normal rate, regular rhythm


Respiratory/Chest:  lungs clear


Abdomen:  normal bowel sounds, non tender, soft, no organomegaly


Edema:  no edema noted Arm (L), no edema noted Arm (R), no edema noted Leg (L), 

no edema noted Leg (R), no edema noted Pedal (L), no edema noted Pedal (R), no 

edema noted Generalized


Neurologic:  alert, responsive











JANE SANCHEZ Feb 4, 2017 06:56

## 2017-02-05 VITALS — DIASTOLIC BLOOD PRESSURE: 68 MMHG | SYSTOLIC BLOOD PRESSURE: 125 MMHG

## 2017-02-05 VITALS — DIASTOLIC BLOOD PRESSURE: 76 MMHG | SYSTOLIC BLOOD PRESSURE: 117 MMHG

## 2017-02-05 VITALS — SYSTOLIC BLOOD PRESSURE: 125 MMHG | DIASTOLIC BLOOD PRESSURE: 65 MMHG

## 2017-02-05 VITALS — DIASTOLIC BLOOD PRESSURE: 79 MMHG | SYSTOLIC BLOOD PRESSURE: 136 MMHG

## 2017-02-05 VITALS — SYSTOLIC BLOOD PRESSURE: 121 MMHG | DIASTOLIC BLOOD PRESSURE: 71 MMHG

## 2017-02-05 VITALS — DIASTOLIC BLOOD PRESSURE: 71 MMHG | SYSTOLIC BLOOD PRESSURE: 133 MMHG

## 2017-02-05 LAB
ALBUMIN/GLOB SERPL: 1.5 {RATIO} (ref 1–2.7)
ALT SERPL-CCNC: 20 U/L (ref 3–41)
ANION GAP SERPL CALC-SCNC: 14 MMOL/L (ref 5–15)
AST SERPL-CCNC: 23 U/L (ref 5–40)
BASOPHILS NFR BLD AUTO: 0.4 % (ref 0–2)
CALCIUM SERPL-MCNC: 8.9 MG/DL (ref 8.6–10.2)
CHLORIDE SERPL-SCNC: 96 MEQ/L (ref 98–107)
CO2 SERPL-SCNC: 28 MEQ/L (ref 20–30)
CREAT SERPL-MCNC: 1.5 MG/DL (ref 0.7–1.2)
EOSINOPHIL NFR BLD AUTO: 0.1 % (ref 0–3)
ERYTHROCYTE [DISTWIDTH] IN BLOOD BY AUTOMATED COUNT: 13.1 % (ref 11.6–14.8)
GLOBULIN SER-MCNC: 2.6 G/DL
HEMOLYSIS: 6
LYMPHOCYTES NFR BLD AUTO: 19.7 % (ref 20–45)
MCH RBC QN AUTO: 25.5 PG (ref 27–31)
MCHC RBC AUTO-ENTMCNC: 31.4 G/DL (ref 32–36)
MCV RBC AUTO: 81 FL (ref 80–99)
MONOCYTES NFR BLD AUTO: 7 % (ref 1–10)
NEUTROPHILS NFR BLD AUTO: 72.8 % (ref 45–75)
PLATELET # BLD: 190 K/UL (ref 150–450)
PMV BLD AUTO: 5.7 FL (ref 6.5–10.1)
POTASSIUM SERPL-SCNC: 3.8 MEQ/L (ref 3.4–4.9)
PROT SERPL-MCNC: 6.6 G/DL (ref 6.6–8.7)
RBC # BLD AUTO: 5.14 M/UL (ref 4.7–6.1)
SODIUM SERPL-SCNC: 138 MEQ/L (ref 135–145)
WBC # BLD AUTO: 8.5 K/UL (ref 4.8–10.8)

## 2017-02-05 RX ADMIN — OSELTAMIVIR PHOSPHATE SCH MG: 75 CAPSULE ORAL at 17:35

## 2017-02-05 RX ADMIN — DOCUSATE SODIUM SCH MG: 100 CAPSULE, LIQUID FILLED ORAL at 17:35

## 2017-02-05 RX ADMIN — IPRATROPIUM BROMIDE AND ALBUTEROL SULFATE SCH ML: .5; 3 SOLUTION RESPIRATORY (INHALATION) at 02:57

## 2017-02-05 RX ADMIN — IPRATROPIUM BROMIDE AND ALBUTEROL SULFATE SCH ML: .5; 3 SOLUTION RESPIRATORY (INHALATION) at 19:05

## 2017-02-05 RX ADMIN — FLUTICASONE PROPIONATE AND SALMETEROL SCH PUFFS: 50; 250 POWDER RESPIRATORY (INHALATION) at 21:24

## 2017-02-05 RX ADMIN — ATORVASTATIN CALCIUM SCH MG: 20 TABLET, FILM COATED ORAL at 21:43

## 2017-02-05 RX ADMIN — ASPIRIN SCH MG: 81 TABLET, DELAYED RELEASE ORAL at 09:15

## 2017-02-05 RX ADMIN — HEPARIN SODIUM SCH UNITS: 5000 INJECTION INTRAVENOUS; SUBCUTANEOUS at 21:44

## 2017-02-05 RX ADMIN — LOSARTAN POTASSIUM AND HYDROCHLOROTHIAZIDE SCH TAB: 12.5; 5 TABLET ORAL at 09:15

## 2017-02-05 RX ADMIN — IPRATROPIUM BROMIDE AND ALBUTEROL SULFATE SCH ML: .5; 3 SOLUTION RESPIRATORY (INHALATION) at 07:55

## 2017-02-05 RX ADMIN — OSELTAMIVIR PHOSPHATE SCH MG: 75 CAPSULE ORAL at 09:15

## 2017-02-05 RX ADMIN — AMOXICILLIN SCH MG: 500 CAPSULE ORAL at 21:43

## 2017-02-05 RX ADMIN — AMOXICILLIN SCH MG: 500 CAPSULE ORAL at 14:04

## 2017-02-05 RX ADMIN — IPRATROPIUM BROMIDE AND ALBUTEROL SULFATE SCH ML: .5; 3 SOLUTION RESPIRATORY (INHALATION) at 23:25

## 2017-02-05 RX ADMIN — DOCUSATE SODIUM SCH MG: 100 CAPSULE, LIQUID FILLED ORAL at 09:15

## 2017-02-05 RX ADMIN — IPRATROPIUM BROMIDE AND ALBUTEROL SULFATE SCH ML: .5; 3 SOLUTION RESPIRATORY (INHALATION) at 11:49

## 2017-02-05 RX ADMIN — PREDNISONE SCH MG: 20 TABLET ORAL at 09:15

## 2017-02-05 RX ADMIN — ZOLPIDEM TARTRATE PRN MG: 5 TABLET ORAL at 01:43

## 2017-02-05 RX ADMIN — SODIUM CHLORIDE SCH MLS/HR: 0.9 INJECTION INTRAVENOUS at 00:39

## 2017-02-05 RX ADMIN — HEPARIN SODIUM SCH UNITS: 5000 INJECTION INTRAVENOUS; SUBCUTANEOUS at 09:17

## 2017-02-05 NOTE — PROGRESS NOTE
DATE:  02/04/2017



CARDIOLOGY PROGRESS NOTE



SUBJECTIVE:  The patient remains on respiratory isolation.  He

remains congested.  He is on antiviral therapy.



OBJECTIVE:

VITAL SIGNS:  Blood pressure 129/70, heart rate 86, respiratory rate

18, afebrile, and room air oxygen saturation 98%.

LUNGS:  Moderate rhonchi.

HEART:  Regular rhythm and rate.  Normal S1 and S2.

ABDOMEN:  Soft.

EXTREMITIES:  No edema.



LABORATORY DATA:  No new laboratories.



IMPRESSION:

1. Influenza.

2. Stable angina.

3. Coronary artery disease with history of coronary stent.

4. Chronic obstructive pulmonary disease exacerbation.

5. Acute bronchospasm.

6. Secondary sinus tachycardia, resolved.



PLAN:

1. Cardiac monitoring.

2. Monitor volume status.

3. Continue baseline antianginal therapy.

4. Lipid analysis to follow.









  ______________________________________________

  David Richards M.D.





DR:  KAMI

D:  02/04/2017 23:23

T:  02/05/2017 03:41

JOB#:  [id number]

CC:

## 2017-02-05 NOTE — PULMONOLOGY PROGRESS NOTE
Assessment/Plan


Assessment/Plan


PROBLEM LIST:


1. Influenza A upper respiratory infection/pneumonitis.


2. Chronic obstructive pulmonary disease with acute exacerbation.


3. Urinary tract infection.


4. Coronary artery disease status post percutaneous coronary


intervention.


5. Hypertension.


6. Hyperlipidemia.


7. Diabetes.


8. Obstructive sleep apnea, untreated.


9. History of noncompliance.


10. Former tobacco use.


11 UTI enterococcus





TREATMENT PLAN:


1. Optimize pulmonary hygiene and mobilize as tolerated.


2. Continue Advair.


3. Round-the-clock and p.r.n. DuoNebs.


4. prednisone 30 IV12 


5. Tamiflu 


6. Levaquin 


7. Follow sputum culture and urine culture.


8. Monitor volumes/continue p.o. Lasix.


9. Heparin subcutaneous for DVT prophylaxis.


10 encourage compliance





Subjective


Constitutional:  Reports: no symptoms


HEENT:  Repors: no symptoms


Respiratory:  Reports: no symptoms, productive cough, shortness of breath


Neurologic:  Reports: no symptoms


Skin:  Reports: no symptoms


Hematologic:  Reports: no symptoms


Allergies:  


Coded Allergies:  


     No Known Allergies (Unverified , 2/19/14)


Subjective


still with sob


no bleeding


on o2


cough with out phlegm


oob but weak


tolerating some po





Objective





Last 24 Hour Vital Signs








  Date Time  Temp Pulse Resp B/P Pulse Ox O2 Delivery O2 Flow Rate FiO2


 


2/5/17 16:00 97.5 81 16 125/65 93 Room Air  


 


2/5/17 15:56  75 19  100 Room Air  21 2/5/17 15:45        21


 


2/5/17 15:45  73 18  97 Room Air  21 2/5/17 12:00 97.4 85 17 136/79 96   


 


2/5/17 12:00  70      


 


2/5/17 11:59  75 19  100 Room Air  21


 


2/5/17 11:49  71 18  100 Room Air  21


 


2/5/17 11:49        21


 


2/5/17 09:15    125/71    


 


2/5/17 09:00 97.7 74 17 121/71    


 


2/5/17 08:00  66      


 


2/5/17 07:55  74 18  100 Room Air  21


 


2/5/17 07:45      Room Air  21


 


2/5/17 07:45  70 16  96 Room Air  21


 


2/5/17 07:45        21


 


2/5/17 07:45     96 Room Air  21


 


2/5/17 04:00 96.8 74 18 117/76 97 Room Air  


 


2/5/17 04:00  73      


 


2/5/17 02:55      Room Air  


 


2/5/17 02:55      Room Air  21 2/5/17 00:00 98.1 84 20 133/71 96 Room Air  


 


2/5/17 00:00  84      


 


2/4/17 23:16  77 18  99 Room Air  21


 


2/4/17 23:11  53 18  95 Room Air  


 


2/4/17 20:00 97.5 81 20 130/69 93 Room Air  


 


2/4/17 20:00  79      


 


2/4/17 19:12  76 18  99 Nasal Cannula 2.0 28


 


2/4/17 19:05  76 18  92 Room Air  


 


2/4/17 19:05     92 Room Air  21 2/4/17 19:05      Room Air  

















Intake and Output  


 


 2/4/17 2/5/17





 19:00 07:00


 


Intake Total 240 ml 295 ml


 


Balance 240 ml 295 ml


 


  


 


Intake Oral 240 ml 240 ml


 


IV Total  55 ml


 


# Voids 1 








General Appearance:  WD/WN


HEENT:  anicteric


Respiratory/Chest:  rhonchi


Cardiovascular:  regular rhythm


Abdomen:  soft, non tender


Extremities:  no cyanosis


Skin:  no lesions





Microbiology








 Date/Time


Source Procedure


Growth Status


 


 


 2/3/17 05:15


Nasal Nares Influenza Types A,B Antigen (GUANACO) - Final Complete


 


 2/2/17 20:15


Urine,Clean Catch Urine Culture - Final


Enterococcus Faecalis Complete








Laboratory Tests


2/5/17 08:40: 


White Blood Count 8.5, Red Blood Count 5.14, Hemoglobin 13.1L, Hematocrit 41.7L

, Mean Corpuscular Volume 81, Mean Corpuscular Hemoglobin 25.5L, Mean 

Corpuscular Hemoglobin Concent 31.4L, Red Cell Distribution Width 13.1, 

Platelet Count 190, Mean Platelet Volume 5.7L, Neutrophils (%) (Auto) 72.8, 

Lymphocytes (%) (Auto) 19.7L, Monocytes (%) (Auto) 7.0, Eosinophils (%) (Auto) 

0.1, Basophils (%) (Auto) 0.4, Sodium Level 138, Potassium Level 3.8, Chloride 

Level 96L, Carbon Dioxide Level 28, Anion Gap 14, Blood Urea Nitrogen 31H, 

Creatinine 1.5H, Estimat Glomerular Filtration Rate , Glucose Level 95, Calcium 

Level 8.9, Total Bilirubin 0.2, Aspartate Amino Transf (AST/SGOT) 23, Alanine 

Aminotransferase (ALT/SGPT) 20, Alkaline Phosphatase 67, Total Protein 6.6, 

Albumin 4.0, Globulin 2.6, Albumin/Globulin Ratio 1.5





Current Medications








 Medications


  (Trade)  Dose


 Ordered  Sig/Kyara


 Route


 PRN Reason  Start Time


 Stop Time Status Last Admin


Dose Admin


 


 Acetaminophen


  (Tylenol)  500 mg  Q4H  PRN


 ORAL


 Mild Pain/Temp > 100.5  2/2/17 22:00


 3/4/17 21:59   


 


 


 Albuterol/


 Ipratropium


  (DuoNeb


 0.5-3(2.5)mg/3ml)  3 ml  Q4HRT


 HHN


   2/2/17 23:00


 2/7/17 22:59  2/5/17 11:49


 


 


 Alprazolam


  (Xanax)  0.5 mg  DAILY  PRN


 ORAL


 Agitation  2/2/17 22:00


 2/9/17 21:59   


 


 


 Amoxicillin


  (Amoxicillin)  500 mg  EVERY 8  HOURS


 ORAL


   2/5/17 14:00


 2/12/17 13:59  2/5/17 14:04


 


 


 Aspirin


  (Ecotrin)  81 mg  DAILY


 ORAL


   2/4/17 09:00


 3/6/17 08:59  2/5/17 09:15


 


 


 Atorvastatin


 Calcium


  (Lipitor)  20 mg  BEDTIME


 ORAL


   2/3/17 21:00


 3/5/17 20:59  2/4/17 21:21


 


 


 Diphenhydramine


 HCl


  (Benadryl)  25 mg  EVERY 4 HOURS  PRN


 IVP


 Itching  2/4/17 19:00


 3/6/17 18:59  2/4/17 18:32


 


 


 Docusate Sodium


  (Colace)  100 mg  TWICE A  DAY


 ORAL


   2/4/17 09:00


 3/6/17 08:59  2/5/17 17:35


 


 


 HCTZ/Losartan


 Potassium


  (Hyzaar 50-12.5)  1 tab  DAILY


 ORAL


   2/3/17 09:00


 3/5/17 08:59  2/5/17 09:15


 


 


 Heparin Sodium


  (Porcine)


  (Heparin 5000


 units/ml)  5,000 units  EVERY 12  HOURS


 SUBQ


   2/3/17 09:00


 3/5/17 08:59  2/5/17 09:17


 


 


 Metoclopramide HCl


  (Reglan)  10 mg  Q6H  PRN


 ORAL


 Nausea & Vomiting  2/2/17 22:00


 3/4/17 21:59   


 


 


 Oseltamivir


 Phosphate


  (Tamiflu)  75 mg  TWICE A  DAY


 ORAL


   2/3/17 10:30


 2/8/17 10:29  2/5/17 17:35


 


 


 Pantoprazole


  (Protonix)  40 mg  ACBREAKFAST


 ORAL


   2/4/17 06:30


 3/5/17 08:59  2/5/17 06:45


 


 


 Polyethylene


 Glycol


  (Miralax)  17 gm  DAILYPRN  PRN


 ORAL


 Constipation  2/4/17 07:00


 3/6/17 06:59   


 


 


 Prednisone


  (predniSONE)  30 mg  DAILY


 ORAL


   2/4/17 09:00


 3/6/17 08:59  2/5/17 09:15


 


 


 Promethazine HCl/


 Codeine


  (Phenergan with


 Codeine)  10 ml  TIDPRN  PRN


 ORAL


 For Cough  2/4/17 07:00


 3/6/17 06:59   


 


 


 Salmeterol


 Xinafoate/


 Fluticasone


  (Advair 250/50


 Diskus)  1 puffs  BEDTIME


 INH


   2/3/17 21:00


 3/5/17 20:59  2/4/17 20:25


 


 


 Solifenacin


  (Vesicare)  5 mg  BEDTIME


 ORAL


   2/3/17 21:00


 3/5/17 20:59  2/4/17 21:21


 


 


 Zolpidem Tartrate


  (Ambien)  5 mg  BEDTIME  PRN


 ORAL


 Insomnia  2/2/17 22:00


 3/4/17 21:59  2/5/17 01:43


 

















ISAI GOMEZ DO Feb 5, 2017 18:10

## 2017-02-05 NOTE — GENERAL PROGRESS NOTE
Assessment/Plan


Problem List:  


(1) Constipation


ICD Codes:  K59.00 - Constipation, unspecified


SNOMED:  61693185


(2) Influenza A


ICD Codes:  J10.1 - Influenza due to other identified influenza virus with 

other respiratory manifestations


SNOMED:  075016109


(3) COPD (chronic obstructive pulmonary disease)


ICD Codes:  J44.9 - Chronic obstructive pulmonary disease, unspecified


SNOMED:  09996406


(4) Asthma exacerbation


ICD Codes:  J45.901 - Unspecified asthma with (acute) exacerbation


SNOMED:  250322947


Status:  stable, progressing


Assessment/Plan


tamiflu


dc iv steroids- Po


iv abx


follow up urine culture


laxatives


dc planning next 24-48hrs if continued improvement





Subjective


ROS Limited/Unobtainable:  No


Constitutional:  Reports: malaise, weakness


HEENT:  Reports: no symptoms


Cardiovascular:  Reports: no symptoms


Respiratory:  Reports: cough


Gastrointestinal/Abdominal:  Reports: no symptoms


Genitourinary:  Reports: no symptoms


Neurologic/Psychiatric:  Reports: no symptoms


Endocrine:  Reports: no symptoms


Hematologic/Lymphatic:  Reports: no symptoms


Allergies:  


Coded Allergies:  


     No Known Allergies (Unverified , 2/19/14)


All Systems:  reviewed and negative except above


Subjective


overall better. still does not feel well. no cp.sob. ucx with strep. on tamiflu 

and iv abx.





Objective





Last 24 Hour Vital Signs








  Date Time  Temp Pulse Resp B/P Pulse Ox O2 Delivery O2 Flow Rate FiO2


 


2/5/17 04:00 96.8 74 18 117/76 97 Room Air  


 


2/5/17 04:00  73      


 


2/5/17 02:55      Room Air  


 


2/5/17 02:55      Room Air  21 2/5/17 00:00 98.1 84 20 133/71 96 Room Air  


 


2/5/17 00:00  84      


 


2/4/17 23:16  77 18  99 Room Air  21


 


2/4/17 23:11  53 18  95 Room Air  


 


2/4/17 20:00 97.5 81 20 130/69 93 Room Air  


 


2/4/17 20:00  79      


 


2/4/17 19:12  76 18  99 Nasal Cannula 2.0 28


 


2/4/17 19:05  76 18  92 Room Air  


 


2/4/17 19:05     92 Room Air  21


 


2/4/17 19:05      Room Air  


 


2/4/17 16:00 97.7 86 18 129/70 98 Room Air  


 


2/4/17 16:00  83      


 


2/4/17 12:00 97.9 76 20 125/69 100 Simple Mask  


 


2/4/17 11:35  80 20  99 Nasal Cannula 2.0 28


 


2/4/17 11:30  77 18  99 Nasal Cannula 2.0 28


 


2/4/17 09:08    135/77    

















Intake and Output  


 


 2/4/17 2/5/17





 19:00 07:00


 


Intake Total 240 ml 295 ml


 


Balance 240 ml 295 ml


 


  


 


Intake Oral 240 ml 240 ml


 


IV Total  55 ml


 


# Voids 1 








Height (Feet):  5


Height (Inches):  10.00


Weight (Pounds):  200


Objective


General Appearance:  WD/WN, alert


Neck:  supple


Cardiovascular:  normal rate, regular rhythm


Respiratory/Chest:  lungs clear


Abdomen:  normal bowel sounds, non tender, soft, no organomegaly


Edema:  no edema noted Arm (L), no edema noted Arm (R), no edema noted Leg (L), 

no edema noted Leg (R), no edema noted Pedal (L), no edema noted Pedal (R), no 

edema noted Generalized


Neurologic:  alert, responsive











JANE SANCHEZ Feb 5, 2017 08:21

## 2017-02-05 NOTE — INFECTIOUS DISEASES PROG NOTE
Assessment/Plan


Assessment/Plan


A:


1. influenza A URI


2. Enterococcal UTI


3. HTN


4. COPD





P


1. continue Tamiflu 2 more days


2. change ceftriaxone to Amoxicillin





Subjective


ROS Limited/Unobtainable:  Yes


Constitutional:  Reports: no symptoms


Allergies:  


Coded Allergies:  


     No Known Allergies (Unverified , 2/19/14)





Objective


Vital Signs





Last 24 Hour Vital Signs








  Date Time  Temp Pulse Resp B/P Pulse Ox O2 Delivery O2 Flow Rate FiO2


 


2/5/17 09:15    125/71    


 


2/5/17 09:00 97.7 74 17 121/71    


 


2/5/17 08:00  66      


 


2/5/17 07:55  74 18  100 Room Air  21 2/5/17 07:45      Room Air  21


 


2/5/17 07:45  70 16  96 Room Air  21 2/5/17 07:45        21


 


2/5/17 07:45     96 Room Air  21


 


2/5/17 04:00 96.8 74 18 117/76 97 Room Air  


 


2/5/17 04:00  73      


 


2/5/17 02:55      Room Air  


 


2/5/17 02:55      Room Air  21


 


2/5/17 00:00 98.1 84 20 133/71 96 Room Air  


 


2/5/17 00:00  84      


 


2/4/17 23:16  77 18  99 Room Air  21


 


2/4/17 23:11  53 18  95 Room Air  


 


2/4/17 20:00 97.5 81 20 130/69 93 Room Air  


 


2/4/17 20:00  79      


 


2/4/17 19:12  76 18  99 Nasal Cannula 2.0 28


 


2/4/17 19:05  76 18  92 Room Air  


 


2/4/17 19:05     92 Room Air  21 2/4/17 19:05      Room Air  


 


2/4/17 16:00 97.7 86 18 129/70 98 Room Air  


 


2/4/17 16:00  83      


 


2/4/17 12:00 97.9 76 20 125/69 100 Simple Mask  


 


2/4/17 11:35  80 20  99 Nasal Cannula 2.0 28


 


2/4/17 11:30  77 18  99 Nasal Cannula 2.0 28








Height (Feet):  5


Height (Inches):  10.00


Weight (Pounds):  200


General Appearance:  no acute distress


HEENT:  mucous membranes moist


Respiratory/Chest:  rhonchi - bilaterally, other


Cardiovascular:  normal rate


Abdomen:  soft, non tender


Extremities:  no edema


Neurologic/Psychiatric:  alert, oriented x 3, responsive





Microbiology








 Date/Time


Source Procedure


Growth Status


 


 


 2/3/17 05:15


Nasal Nares Influenza Types A,B Antigen (GUANACO) - Final Complete


 


 2/2/17 20:15


Urine,Clean Catch Urine Culture - Final


Enterococcus Faecalis Complete











Laboratory Tests








Test


  2/5/17


08:40


 


White Blood Count


  8.5 K/UL


(4.8-10.8)


 


Red Blood Count


  5.14 M/UL


(4.70-6.10)


 


Hemoglobin


  13.1 G/DL


(14.2-18.0)  L


 


Hematocrit


  41.7 %


(42.0-52.0)  L


 


Mean Corpuscular Volume 81 FL (80-99)  


 


Mean Corpuscular Hemoglobin


  25.5 PG


(27.0-31.0)  L


 


Mean Corpuscular Hemoglobin


Concent 31.4 G/DL


(32.0-36.0)  L


 


Red Cell Distribution Width


  13.1 %


(11.6-14.8)


 


Platelet Count


  190 K/UL


(150-450)


 


Mean Platelet Volume


  5.7 FL


(6.5-10.1)  L


 


Neutrophils (%) (Auto)


  72.8 %


(45.0-75.0)


 


Lymphocytes (%) (Auto)


  19.7 %


(20.0-45.0)  L


 


Monocytes (%) (Auto)


  7.0 %


(1.0-10.0)


 


Eosinophils (%) (Auto)


  0.1 %


(0.0-3.0)


 


Basophils (%) (Auto)


  0.4 %


(0.0-2.0)


 


Sodium Level


  138 mEQ/L


(135-145)


 


Potassium Level


  3.8 mEQ/L


(3.4-4.9)


 


Chloride Level


  96 mEQ/L


()  L


 


Carbon Dioxide Level


  28 mEQ/L


(20-30)


 


Anion Gap 14 (5-15)  


 


Blood Urea Nitrogen


  31 mg/dL


(7-23)  H


 


Creatinine


  1.5 mg/dL


(0.7-1.2)  H


 


Estimat Glomerular Filtration


Rate  mL/min (>60)  


 


 


Glucose Level


  95 mg/dL


()


 


Calcium Level


  8.9 mg/dL


(8.6-10.2)


 


Total Bilirubin


  0.2 mg/dL


(0.0-1.2)


 


Aspartate Amino Transf


(AST/SGOT) 23 U/L (5-40)  


 


 


Alanine Aminotransferase


(ALT/SGPT) 20 U/L (3-41)  


 


 


Alkaline Phosphatase


  67 U/L


()


 


Total Protein


  6.6 g/dL


(6.6-8.7)


 


Albumin


  4.0 g/dL


(3.5-5.2)


 


Globulin 2.6 g/dL  


 


Albumin/Globulin Ratio 1.5 (1.0-2.7)  











Current Medications








 Medications


  (Trade)  Dose


 Ordered  Sig/Kyara


 Route


 PRN Reason  Start Time


 Stop Time Status Last Admin


Dose Admin


 


 Acetaminophen


  (Tylenol)  500 mg  Q4H  PRN


 ORAL


 Mild Pain/Temp > 100.5  2/2/17 22:00


 3/4/17 21:59   


 


 


 Albuterol/


 Ipratropium 3 ml  3 ml  Q4HRT


 HHN


   2/2/17 23:00


 2/7/17 22:59  2/5/17 07:55


 


 


 Alprazolam


  (Xanax)  0.5 mg  DAILY  PRN


 ORAL


 Agitation  2/2/17 22:00


 2/9/17 21:59   


 


 


 Aspirin


  (Ecotrin)  81 mg  DAILY


 ORAL


   2/4/17 09:00


 3/6/17 08:59  2/5/17 09:15


 


 


 Atorvastatin


 Calcium


  (Lipitor)  20 mg  BEDTIME


 ORAL


   2/3/17 21:00


 3/5/17 20:59  2/4/17 21:21


 


 


 Ceftriaxone


 Sodium/Dextrose


  (Rocephin/D5W)  55 ml @ 


 110 mls/hr  Q24H


 IVPB


   2/3/17 00:00


 2/10/17 00:00  2/5/17 00:39


 


 


 Diphenhydramine


 HCl


  (Benadryl)  25 mg  EVERY 4 HOURS  PRN


 IVP


 Itching  2/4/17 19:00


 3/6/17 18:59  2/4/17 18:32


 


 


 Docusate Sodium


  (Colace)  100 mg  TWICE A  DAY


 ORAL


   2/4/17 09:00


 3/6/17 08:59  2/5/17 09:15


 


 


 HCTZ/Losartan


 Potassium


  (Hyzaar 50-12.5)  1 tab  DAILY


 ORAL


   2/3/17 09:00


 3/5/17 08:59  2/5/17 09:15


 


 


 Heparin Sodium


  (Porcine)


  (Heparin 5000


 units/ml)  5,000 units  EVERY 12  HOURS


 SUBQ


   2/3/17 09:00


 3/5/17 08:59  2/5/17 09:17


 


 


 Metoclopramide HCl


  (Reglan)  10 mg  Q6H  PRN


 ORAL


 Nausea & Vomiting  2/2/17 22:00


 3/4/17 21:59   


 


 


 Oseltamivir


 Phosphate


  (Tamiflu)  75 mg  TWICE A  DAY


 ORAL


   2/3/17 10:30


 2/8/17 10:29  2/5/17 09:15


 


 


 Pantoprazole


  (Protonix)  40 mg  ACBREAKFAST


 ORAL


   2/4/17 06:30


 3/5/17 08:59  2/5/17 06:45


 


 


 Polyethylene


 Glycol


  (Miralax)  17 gm  DAILYPRN  PRN


 ORAL


 Constipation  2/4/17 07:00


 3/6/17 06:59   


 


 


 Prednisone


  (predniSONE)  30 mg  DAILY


 ORAL


   2/4/17 09:00


 3/6/17 08:59  2/5/17 09:15


 


 


 Promethazine HCl/


 Codeine


  (Phenergan with


 Codeine)  10 ml  TIDPRN  PRN


 ORAL


 For Cough  2/4/17 07:00


 3/6/17 06:59   


 


 


 Salmeterol


 Xinafoate/


 Fluticasone


  (Advair 250/50


 Diskus)  1 puffs  BEDTIME


 INH


   2/3/17 21:00


 3/5/17 20:59  2/4/17 20:25


 


 


 Solifenacin


  (Vesicare)  5 mg  BEDTIME


 ORAL


   2/3/17 21:00


 3/5/17 20:59  2/4/17 21:21


 


 


 Zolpidem Tartrate


  (Ambien)  5 mg  BEDTIME  PRN


 ORAL


 Insomnia  2/2/17 22:00


 3/4/17 21:59  2/5/17 01:43


 

















KENDALL LIMON Feb 5, 2017 11:08

## 2017-02-06 VITALS — SYSTOLIC BLOOD PRESSURE: 123 MMHG | DIASTOLIC BLOOD PRESSURE: 73 MMHG

## 2017-02-06 VITALS — SYSTOLIC BLOOD PRESSURE: 127 MMHG | DIASTOLIC BLOOD PRESSURE: 77 MMHG

## 2017-02-06 VITALS — DIASTOLIC BLOOD PRESSURE: 90 MMHG | SYSTOLIC BLOOD PRESSURE: 114 MMHG

## 2017-02-06 VITALS — SYSTOLIC BLOOD PRESSURE: 149 MMHG | DIASTOLIC BLOOD PRESSURE: 78 MMHG

## 2017-02-06 RX ADMIN — IPRATROPIUM BROMIDE AND ALBUTEROL SULFATE SCH ML: .5; 3 SOLUTION RESPIRATORY (INHALATION) at 03:00

## 2017-02-06 RX ADMIN — LOSARTAN POTASSIUM AND HYDROCHLOROTHIAZIDE SCH TAB: 12.5; 5 TABLET ORAL at 09:28

## 2017-02-06 RX ADMIN — DIPHENHYDRAMINE HYDROCHLORIDE PRN MG: 50 INJECTION INTRAMUSCULAR; INTRAVENOUS at 01:02

## 2017-02-06 RX ADMIN — DOCUSATE SODIUM SCH MG: 100 CAPSULE, LIQUID FILLED ORAL at 09:28

## 2017-02-06 RX ADMIN — AMOXICILLIN SCH MG: 500 CAPSULE ORAL at 13:16

## 2017-02-06 RX ADMIN — OSELTAMIVIR PHOSPHATE SCH MG: 75 CAPSULE ORAL at 09:28

## 2017-02-06 RX ADMIN — ZOLPIDEM TARTRATE PRN MG: 5 TABLET ORAL at 01:50

## 2017-02-06 RX ADMIN — HEPARIN SODIUM SCH UNITS: 5000 INJECTION INTRAVENOUS; SUBCUTANEOUS at 09:32

## 2017-02-06 RX ADMIN — IPRATROPIUM BROMIDE AND ALBUTEROL SULFATE SCH ML: .5; 3 SOLUTION RESPIRATORY (INHALATION) at 11:58

## 2017-02-06 RX ADMIN — PREDNISONE SCH MG: 20 TABLET ORAL at 09:28

## 2017-02-06 RX ADMIN — ASPIRIN SCH MG: 81 TABLET, DELAYED RELEASE ORAL at 09:28

## 2017-02-06 RX ADMIN — IPRATROPIUM BROMIDE AND ALBUTEROL SULFATE SCH ML: .5; 3 SOLUTION RESPIRATORY (INHALATION) at 07:57

## 2017-02-06 RX ADMIN — AMOXICILLIN SCH MG: 500 CAPSULE ORAL at 06:23

## 2017-02-06 NOTE — PULMONOLOGY PROGRESS NOTE
Assessment/Plan


Problems:  


(1) Asthma exacerbation


(2) Bronchitis with asthma, acute


(3) COPD exacerbation


(4) Constipation


(5) Influenza A


Assessment/Plan


ASSESSMENT:  The patient is a 76-year-old male former smoker with a history of 

chronic obstructive pulmonary disease, coronary artery disease status post 

percutaneous coronary intervention, hypertension, hyperlipidemia, chronic 

kidney disease, diabetes, untreated obstructive sleep apnea, and medication 

noncompliance, presenting with an exacerbation


of his chronic obstructive pulmonary disease likely brought by an influenza A 

upper respiratory infection/pneumonitis.





PROBLEM LIST:


1. Influenza A upper respiratory infection/pneumonitis.


2. Chronic obstructive pulmonary disease with acute exacerbation.


3. Urinary tract infection.


4. Coronary artery disease status post percutaneous coronary intervention.


5. Hypertension.


6. Hyperlipidemia.


7. Diabetes.


8. Obstructive sleep apnea, untreated.


9. History of noncompliance.


10. Former tobacco use.





TREATMENT PLAN:


1. Optimize pulmonary hygiene and mobilize as tolerated.


2. Continue Advair.


3. Change DUOnebs to PRN


4. Taper Prendisone


5. Tamiflu and Abx per ID


6. Monitor volumes/continue p.o. Lasix.


7. Heparin subcutaneous for DVT prophylaxis.


8. Stable for D/C from a pulmonary standpoint





Subjective


Allergies:  


Coded Allergies:  


     No Known Allergies (Unverified , 2/19/14)


Subjective


Events reviewed


AFVSS, stable on RA


Less cough, no wheezing, no SOB, no F/C





Objective





Last 24 Hour Vital Signs








  Date Time  Temp Pulse Resp B/P Pulse Ox O2 Delivery O2 Flow Rate FiO2


 


2/6/17 12:04  73 19  97 Room Air 2.0 21


 


2/6/17 11:57  71 18  96 Room Air  21


 


2/6/17 11:57        21


 


2/6/17 11:39 97.3 69 20 149/78 95 Nasal Cannula 2.0 


 


2/6/17 09:28    127/77    


 


2/6/17 08:02 97.5 70 20 127/77 96 Nasal Cannula 2.0 


 


2/6/17 08:00  68      


 


2/6/17 07:59      Room Air  21


 


2/6/17 07:49  70 14  100 Room Air  21


 


2/6/17 07:38  67 14  95 Room Air  21


 


2/6/17 07:38        21


 


2/6/17 07:35     95 Room Air  21


 


2/6/17 04:00  77      


 


2/6/17 04:00 97.7 78 18 114/90 94 Room Air  


 


2/6/17 03:26      Room Air  


 


2/6/17 03:26      Room Air  


 


2/6/17 00:00  79      


 


2/6/17 00:00 97.7 78 18 123/73 93 Room Air  


 


2/5/17 23:33  79 14  100 Room Air  21


 


2/5/17 23:26        21


 


2/5/17 23:26  72 14  98 Room Air  21


 


2/5/17 20:00  83      


 


2/5/17 20:00 97.7 79 18 125/68 94 Room Air  


 


2/5/17 19:19  79 14  100 Room Air  21


 


2/5/17 19:06     98 Room Air  21


 


2/5/17 19:06      Room Air  21


 


2/5/17 19:06  73 14  98 Room Air  21


 


2/5/17 19:06        21


 


2/5/17 16:00 97.5 81 16 125/65 93 Room Air  


 


2/5/17 16:00  75      


 


2/5/17 15:56  75 19  100 Room Air  21


 


2/5/17 15:45        21


 


2/5/17 15:45  73 18  97 Room Air  21

















Intake and Output  


 


 2/5/17 2/6/17





 19:00 07:00


 


Intake Total 650 ml 240 ml


 


Balance 650 ml 240 ml


 


  


 


Intake Oral 650 ml 240 ml


 


# Voids 2 1








General Appearance:  WD/WN, no acute distress


HEENT:  normocephalic, atraumatic, mucous membranes moist


Respiratory/Chest:  lungs clear - but distant, no respiratory distress


Cardiovascular:  normal peripheral pulses, normal rate, regular rhythm


Abdomen:  normal bowel sounds, soft, non tender, no organomegaly, non distended


Extremities:  no cyanosis, no clubbing, no edema





Microbiology








 Date/Time


Source Procedure


Growth Status


 


 


 2/5/17 04:00


Sputum Gram Stain - Final Resulted


 


 2/5/17 04:00


Sputum Sputum Culture


Pending Resulted











Current Medications








 Medications


  (Trade)  Dose


 Ordered  Sig/Kyara


 Route


 PRN Reason  Start Time


 Stop Time Status Last Admin


Dose Admin


 


 Acetaminophen


  (Tylenol)  500 mg  Q4H  PRN


 ORAL


 Mild Pain/Temp > 100.5  2/2/17 22:00


 3/4/17 21:59   


 


 


 Albuterol/


 Ipratropium


  (DuoNeb


 0.5-3(2.5)mg/3ml)  3 ml  Q4HRT


 HHN


   2/2/17 23:00


 2/7/17 22:59  2/6/17 11:58


 


 


 Alprazolam


  (Xanax)  0.5 mg  DAILY  PRN


 ORAL


 Agitation  2/2/17 22:00


 2/9/17 21:59   


 


 


 Amoxicillin


  (Amoxicillin)  500 mg  EVERY 8  HOURS


 ORAL


   2/5/17 14:00


 2/12/17 13:59  2/6/17 13:16


 


 


 Aspirin


  (Ecotrin)  81 mg  DAILY


 ORAL


   2/4/17 09:00


 3/6/17 08:59  2/6/17 09:28


 


 


 Atorvastatin


 Calcium


  (Lipitor)  20 mg  BEDTIME


 ORAL


   2/3/17 21:00


 3/5/17 20:59  2/5/17 21:43


 


 


 Diphenhydramine


 HCl


  (Benadryl)  25 mg  EVERY 4 HOURS  PRN


 IVP


 Itching  2/4/17 19:00


 3/6/17 18:59  2/6/17 01:02


 


 


 Docusate Sodium


  (Colace)  100 mg  TWICE A  DAY


 ORAL


   2/4/17 09:00


 3/6/17 08:59  2/6/17 09:28


 


 


 HCTZ/Losartan


 Potassium


  (Hyzaar 50-12.5)  1 tab  DAILY


 ORAL


   2/3/17 09:00


 3/5/17 08:59  2/6/17 09:28


 


 


 Heparin Sodium


  (Porcine)


  (Heparin 5000


 units/ml)  5,000 units  EVERY 12  HOURS


 SUBQ


   2/3/17 09:00


 3/5/17 08:59  2/6/17 09:32


 


 


 Metoclopramide HCl


  (Reglan)  10 mg  Q6H  PRN


 ORAL


 Nausea & Vomiting  2/2/17 22:00


 3/4/17 21:59   


 


 


 Oseltamivir


 Phosphate


  (Tamiflu)  75 mg  TWICE A  DAY


 ORAL


   2/3/17 10:30


 2/8/17 10:29  2/6/17 09:28


 


 


 Pantoprazole


  (Protonix)  40 mg  ACBREAKFAST


 ORAL


   2/4/17 06:30


 3/5/17 08:59  2/6/17 06:23


 


 


 Polyethylene


 Glycol


  (Miralax)  17 gm  DAILYPRN  PRN


 ORAL


 Constipation  2/4/17 07:00


 3/6/17 06:59   


 


 


 Prednisone


  (predniSONE)  30 mg  DAILY


 ORAL


   2/4/17 09:00


 3/6/17 08:59  2/6/17 09:28


 


 


 Promethazine HCl/


 Codeine


  (Phenergan with


 Codeine)  10 ml  TIDPRN  PRN


 ORAL


 For Cough  2/4/17 07:00


 3/6/17 06:59   


 


 


 Salmeterol


 Xinafoate/


 Fluticasone


  (Advair 250/50


 Diskus)  1 puffs  BEDTIME


 INH


   2/3/17 21:00


 3/5/17 20:59  2/5/17 21:24


 


 


 Solifenacin


  (Vesicare)  5 mg  BEDTIME


 ORAL


   2/3/17 21:00


 3/5/17 20:59  2/5/17 21:43


 


 


 Zolpidem Tartrate


  (Ambien)  5 mg  BEDTIME  PRN


 ORAL


 Insomnia  2/2/17 22:00


 3/4/17 21:59  2/6/17 01:50


 

















HUGO MORELOS M.D. Feb 6, 2017 13:41

## 2017-02-06 NOTE — INFECTIOUS DISEASES PROG NOTE
Assessment/Plan


Assessment/Plan


antibiotics : tamiflu, amoxicillin





A


1. influenza A URI


2. enterococcus UTI


3. HTN


4. COPD





P


1. continue tamiflu 1 more day


2. continue amoxicillin 5 more days


3. will follow up cultures





Subjective


ROS Limited/Unobtainable:  Yes


Allergies:  


Coded Allergies:  


     No Known Allergies (Unverified , 2/19/14)





Objective


Vital Signs





Last 24 Hour Vital Signs








  Date Time  Temp Pulse Resp B/P Pulse Ox O2 Delivery O2 Flow Rate FiO2


 


2/6/17 09:28    127/77    


 


2/6/17 08:02 97.5 70 20 127/77 96 Nasal Cannula 2.0 


 


2/6/17 07:59      Room Air  21


 


2/6/17 07:49  70 14  100 Room Air  21 2/6/17 07:38  67 14  95 Room Air  21 2/6/17 07:38        21


 


2/6/17 07:35     95 Room Air  21


 


2/6/17 04:00  77      


 


2/6/17 04:00 97.7 78 18 114/90 94 Room Air  


 


2/6/17 03:26      Room Air  


 


2/6/17 03:26      Room Air  


 


2/6/17 00:00  79      


 


2/6/17 00:00 97.7 78 18 123/73 93 Room Air  


 


2/5/17 23:33  79 14  100 Room Air  21 2/5/17 23:26        21


 


2/5/17 23:26  72 14  98 Room Air  21 2/5/17 20:00  83      


 


2/5/17 20:00 97.7 79 18 125/68 94 Room Air  


 


2/5/17 19:19  79 14  100 Room Air  21 2/5/17 19:06     98 Room Air  21 2/5/17 19:06      Room Air  21


 


2/5/17 19:06  73 14  98 Room Air  21


 


2/5/17 19:06        21


 


2/5/17 16:00 97.5 81 16 125/65 93 Room Air  


 


2/5/17 16:00  75      


 


2/5/17 15:56  75 19  100 Room Air  21


 


2/5/17 15:45        21


 


2/5/17 15:45  73 18  97 Room Air  21


 


2/5/17 12:00 97.4 85 17 136/79 96   


 


2/5/17 12:00  70      


 


2/5/17 11:59  75 19  100 Room Air  21


 


2/5/17 11:49  71 18  100 Room Air  21


 


2/5/17 11:49        21








Height (Feet):  5


Height (Inches):  10.00


Weight (Pounds):  200


Respiratory/Chest:  lungs clear


Cardiovascular:  normal rate, regular rhythm, no gallop/murmur


Abdomen:  soft, non tender


Extremities:  no edema





Microbiology








 Date/Time


Source Procedure


Growth Status


 


 


 2/5/17 04:00


Sputum Gram Stain - Final Resulted


 


 2/5/17 04:00


Sputum Sputum Culture


Pending Resulted

















KAL CASTAÑEDA Feb 6, 2017 10:57

## 2017-02-06 NOTE — DISCHARGE SUMMARY
DATE OF ADMISSION:  02/02/2017



DATE OF DISCHARGE:  02/06/2017



ADMISSION DIAGNOSES:

1. Chronic obstructive pulmonary disease.

2. Asthma exacerbation.

3. Sepsis.

4. Urinary tract infection.

5. Influenza.

6. Hypertension.



DISCHARGE DIAGNOSES:

1. Chronic obstructive pulmonary disease.

2. Asthma exacerbation.

3. Sepsis.

4. Urinary tract infection.

5. Influenza.

6. Hypertension.



HISTORY AND HOSPITAL COURSE:  The patient is a very pleasant male who

was admitted with complaints of shortness of breath.  He was diagnosed

with chronic obstructive pulmonary disease exacerbation.  He was positive

for the flu, and he had enterococcal sepsis due to urinary tract

infection.  He was treated with Tamiflu and amoxicillin, intravenous

steroids, and respiratory treatments.  He improved quickly.  On discharge,

he is doing well.  He will be discharged to complete one additional day of

Tamiflu as well as five days of amoxicillin for his urinary tract

infection.



DISCHARGE MEDICATIONS:  Please see discharge medication list for

discharge medications.



DIET:  Cardiac diet.



ACTIVITY:  Ad-jama.



FOLLOWUP:  The patient will follow up with his PMD in one to two

weeks.









  ______________________________________________

  Austin Bruno M.D.





DR:  MERCEDES

D:  02/06/2017 11:53

T:  02/06/2017 21:20

JOB#:  [id number]

CC:

## 2017-02-06 NOTE — PROGRESS NOTE
DATE:  02/05/2017



CARDIOLOGY PROGRESS NOTE



SUBJECTIVE:  Monitored rhythm sinus with occasional atrial ectopy.

The patient remains with congestion and shortness of breath, although

improved.



OBJECTIVE:

VITAL SIGNS:  Blood pressure 125/65, pulse 75, respiratory rate 16,

and oxygen saturation is 93% to 100% on room air.

HEENT:  Conjunctivae are pink.  Nasal discharge.  Oropharynx clear.

LUNGS:  With coarse breath sounds and rhonchi.

CARDIAC:  Regular rhythm and rate.  Normal S1 and S2.

ABDOMEN:  Soft.

EXTREMITIES:  Without edema.



IMPRESSION:

1. Influenza A.

2. Chronic obstructive pulmonary disease exacerbation.

3. Coronary artery disease with history of coronary stent.

4. Stable angina.

5. Hypertension, controlled.

6. Hyperlipidemia, on therapy.

7. Diabetes mellitus, uncontrolled.

8. Untreated sleep apnea.



PLAN:

1. Antiviral therapy, bronchodilators and steroids intravenously.

2. Continue anti-platelet therapy and statin drug.

3. Further recommendations will follow based on clinical progress.









  ______________________________________________

  David Richards M.D.





DR:  GUEVARA

D:  02/05/2017 20:41

T:  02/06/2017 01:28

JOB#:  [id number]

CC:

## 2017-02-07 NOTE — CARDIOLOGY REPORT
--------------- APPROVED REPORT --------------





EKG Measurement

Heart [id number]

WI [id number]

[id number]

[id number]

[id number]





Sinus tachycardia

Otherwise normal ECG

## 2017-02-07 NOTE — PROGRESS NOTE
DATE:  02/06/2017



CARDIOLOGY PROGRESS NOTE



SUBJECTIVE:  Condition has improved.  The patient is without

shortness of breath.  Congestion decreased.  Monitor with sinus and rare

atrial ectopy.



OBJECTIVE:

VITAL SIGNS:  Blood pressure 149/78, pulse 69, and respirations 20.

LUNGS:  Bilateral breath sounds.  Few rhonchi.

HEART:  Regular rhythm and rate.  Normal S1 and S2.

ABDOMEN:  Soft.

EXTREMITIES:  No edema.



IMPRESSION:

1. Influenza A.

2. Chronic obstructive pulmonary disease exacerbation.

3. Stable angina.

4. Coronary artery disease.

5. History of coronary stent.

6. Hypertensive heart disease with adequate blood pressure control.  No

clinical signs of acute congestive heart failure.



PLAN:

1. Complete recovery at home.

2. Maintenance dose diuretic orally.

3. Steroid taper.

4. Antivirals and bronchodilators.









  ______________________________________________

  David Richards M.D.





DR:  DASHAWN

D:  02/07/2017 02:24

T:  02/07/2017 02:32

JOB#:  [id number]

CC:

## 2018-01-31 ENCOUNTER — HOSPITAL ENCOUNTER (INPATIENT)
Dept: HOSPITAL 72 - SDSOVERFLO | Age: 78
LOS: 2 days | Discharge: HOME HEALTH | DRG: 707 | End: 2018-02-02
Payer: MEDICARE

## 2018-01-31 VITALS — DIASTOLIC BLOOD PRESSURE: 67 MMHG | SYSTOLIC BLOOD PRESSURE: 118 MMHG

## 2018-01-31 VITALS — DIASTOLIC BLOOD PRESSURE: 63 MMHG | SYSTOLIC BLOOD PRESSURE: 123 MMHG

## 2018-01-31 VITALS — DIASTOLIC BLOOD PRESSURE: 62 MMHG | SYSTOLIC BLOOD PRESSURE: 122 MMHG

## 2018-01-31 VITALS — SYSTOLIC BLOOD PRESSURE: 118 MMHG | DIASTOLIC BLOOD PRESSURE: 60 MMHG

## 2018-01-31 VITALS — WEIGHT: 196 LBS | HEIGHT: 71 IN | BODY MASS INDEX: 27.44 KG/M2

## 2018-01-31 VITALS — SYSTOLIC BLOOD PRESSURE: 129 MMHG | DIASTOLIC BLOOD PRESSURE: 64 MMHG

## 2018-01-31 VITALS — DIASTOLIC BLOOD PRESSURE: 91 MMHG | SYSTOLIC BLOOD PRESSURE: 154 MMHG

## 2018-01-31 VITALS — SYSTOLIC BLOOD PRESSURE: 124 MMHG | DIASTOLIC BLOOD PRESSURE: 81 MMHG

## 2018-01-31 VITALS — SYSTOLIC BLOOD PRESSURE: 133 MMHG | DIASTOLIC BLOOD PRESSURE: 61 MMHG

## 2018-01-31 VITALS — SYSTOLIC BLOOD PRESSURE: 122 MMHG | DIASTOLIC BLOOD PRESSURE: 62 MMHG

## 2018-01-31 VITALS — SYSTOLIC BLOOD PRESSURE: 114 MMHG | DIASTOLIC BLOOD PRESSURE: 64 MMHG

## 2018-01-31 VITALS — SYSTOLIC BLOOD PRESSURE: 117 MMHG | DIASTOLIC BLOOD PRESSURE: 61 MMHG

## 2018-01-31 VITALS — SYSTOLIC BLOOD PRESSURE: 139 MMHG | DIASTOLIC BLOOD PRESSURE: 63 MMHG

## 2018-01-31 VITALS — DIASTOLIC BLOOD PRESSURE: 67 MMHG | SYSTOLIC BLOOD PRESSURE: 127 MMHG

## 2018-01-31 VITALS — DIASTOLIC BLOOD PRESSURE: 68 MMHG | SYSTOLIC BLOOD PRESSURE: 133 MMHG

## 2018-01-31 VITALS — DIASTOLIC BLOOD PRESSURE: 64 MMHG | SYSTOLIC BLOOD PRESSURE: 119 MMHG

## 2018-01-31 DIAGNOSIS — N17.9: ICD-10-CM

## 2018-01-31 DIAGNOSIS — E87.6: ICD-10-CM

## 2018-01-31 DIAGNOSIS — D64.9: ICD-10-CM

## 2018-01-31 DIAGNOSIS — C61: Primary | ICD-10-CM

## 2018-01-31 DIAGNOSIS — J44.9: ICD-10-CM

## 2018-01-31 DIAGNOSIS — I12.9: ICD-10-CM

## 2018-01-31 DIAGNOSIS — N18.9: ICD-10-CM

## 2018-01-31 LAB
ADD MANUAL DIFF: NO
ANION GAP SERPL CALC-SCNC: 6 MMOL/L (ref 5–15)
BASOPHILS NFR BLD AUTO: 0.6 % (ref 0–2)
BUN SERPL-MCNC: 18 MG/DL (ref 7–18)
CALCIUM SERPL-MCNC: 7.6 MG/DL (ref 8.5–10.1)
CHLORIDE SERPL-SCNC: 109 MMOL/L (ref 98–107)
CO2 SERPL-SCNC: 26 MMOL/L (ref 21–32)
CREAT SERPL-MCNC: 1.4 MG/DL (ref 0.55–1.3)
EOSINOPHIL NFR BLD AUTO: 0.8 % (ref 0–3)
ERYTHROCYTE [DISTWIDTH] IN BLOOD BY AUTOMATED COUNT: 13.6 % (ref 11.6–14.8)
HCT VFR BLD CALC: 29.2 % (ref 42–52)
HGB BLD-MCNC: 10.1 G/DL (ref 14.2–18)
LYMPHOCYTES NFR BLD AUTO: 18.1 % (ref 20–45)
MCV RBC AUTO: 78 FL (ref 80–99)
MONOCYTES NFR BLD AUTO: 5.3 % (ref 1–10)
NEUTROPHILS NFR BLD AUTO: 75.3 % (ref 45–75)
PLATELET # BLD: 152 K/UL (ref 150–450)
POTASSIUM SERPL-SCNC: 3.4 MMOL/L (ref 3.5–5.1)
RBC # BLD AUTO: 3.74 M/UL (ref 4.7–6.1)
SODIUM SERPL-SCNC: 141 MMOL/L (ref 136–145)
WBC # BLD AUTO: 9.7 K/UL (ref 4.8–10.8)

## 2018-01-31 PROCEDURE — 94150 VITAL CAPACITY TEST: CPT

## 2018-01-31 PROCEDURE — 71045 X-RAY EXAM CHEST 1 VIEW: CPT

## 2018-01-31 PROCEDURE — 80048 BASIC METABOLIC PNL TOTAL CA: CPT

## 2018-01-31 PROCEDURE — 80053 COMPREHEN METABOLIC PANEL: CPT

## 2018-01-31 PROCEDURE — 85610 PROTHROMBIN TIME: CPT

## 2018-01-31 PROCEDURE — 36415 COLL VENOUS BLD VENIPUNCTURE: CPT

## 2018-01-31 PROCEDURE — 86900 BLOOD TYPING SEROLOGIC ABO: CPT

## 2018-01-31 PROCEDURE — 85025 COMPLETE CBC W/AUTO DIFF WBC: CPT

## 2018-01-31 PROCEDURE — 85730 THROMBOPLASTIN TIME PARTIAL: CPT

## 2018-01-31 PROCEDURE — 87081 CULTURE SCREEN ONLY: CPT

## 2018-01-31 PROCEDURE — 86850 RBC ANTIBODY SCREEN: CPT

## 2018-01-31 PROCEDURE — 0VT04ZZ RESECTION OF PROSTATE, PERCUTANEOUS ENDOSCOPIC APPROACH: ICD-10-PCS

## 2018-01-31 PROCEDURE — 86901 BLOOD TYPING SEROLOGIC RH(D): CPT

## 2018-01-31 PROCEDURE — 83735 ASSAY OF MAGNESIUM: CPT

## 2018-01-31 PROCEDURE — 94003 VENT MGMT INPAT SUBQ DAY: CPT

## 2018-01-31 PROCEDURE — 84100 ASSAY OF PHOSPHORUS: CPT

## 2018-01-31 RX ADMIN — POLYVINYL ALCOHOL PRN DROP: 14 SOLUTION/ DROPS OPHTHALMIC at 18:05

## 2018-01-31 RX ADMIN — SODIUM CHLORIDE PRN MG: 9 INJECTION, SOLUTION INTRAVENOUS at 13:19

## 2018-01-31 RX ADMIN — ATORVASTATIN CALCIUM SCH MG: 20 TABLET, FILM COATED ORAL at 20:25

## 2018-01-31 RX ADMIN — SODIUM CHLORIDE PRN MG: 9 INJECTION, SOLUTION INTRAVENOUS at 11:24

## 2018-01-31 RX ADMIN — POLYVINYL ALCOHOL PRN DROP: 14 SOLUTION/ DROPS OPHTHALMIC at 20:25

## 2018-01-31 RX ADMIN — SODIUM CHLORIDE SCH MLS/HR: 0.9 INJECTION INTRAVENOUS at 16:33

## 2018-01-31 RX ADMIN — DEXTROSE, SODIUM CHLORIDE, AND POTASSIUM CHLORIDE SCH MLS/HR: 5; .45; .15 INJECTION INTRAVENOUS at 15:01

## 2018-01-31 NOTE — BRIEF OPERATIVE NOTE
Immediate Post Operative Note


Operative Note


Pre-op Diagnosis:


prostate cancer


Procedure:


laparoscopic radical prostatectomy


Post-op Diagnosis:


same


Post-op Diagnosis:  same as pre-op


Surgeon:  James Manzanares


Assistant:  Cl ronquillo


Anesthesia:  general


Specimen:  yes


Complications:  none


Condition:  stable


Fluids:  3000


Estimated Blood Loss:  minimal


Implant(s) used?:  No











Harvinder Manzanares MD Jan 31, 2018 11:09

## 2018-01-31 NOTE — DIAGNOSTIC IMAGING REPORT
Indication: Cough

 

Technique: One view of the chest

 

Comparison: February 2, 2017

 

Findings: No acute infiltrates, effusions, or congestion. Tortuous calcified aorta.

Normal heart size. Upper mediastinum unremarkable. No significant change

 

Impression: No acute process.

## 2018-01-31 NOTE — 48 HOUR POST ANESTHESIA EVAL
Post Anesthesia Evaluation


Procedure:  laparoscopic prostatectomy


Date of Evaluation:  Jan 31, 2018


Time of Evaluation:  17:36


Blood Pressure Systolic:  118


0:  67


Airway:  patent


Nausea:  No


Vomiting:  No


Hydration Status:  adequate


Cardiopulmonary Status:


stable


Mental Status/LOC:  patient returned to baseline


Post-Anesthesia Complications:


c/o right eye irritation. denies blurr vision. ordered artificial eye drops prn 

q 2 hours. will reassess in the morning.


Follow-up care needed:  patient intructions given - instructed daughter to 

notify staff if blurr vision or if condition worsens











ABY GALO CRNA Jan 31, 2018 17:37

## 2018-01-31 NOTE — IMMEDIATE POST-OP EVALUATION
Immediate Post-Op Evalulation


Immediate Post-Op Evalulation


Procedure:  laparoscopic prostatectomy


Date of Evaluation:  Jan 31, 2018


Time of Evaluation:  11:00


IV Fluids:  2800


Blood Pressure Systolic:  127


Blood Pressure Diastolic:  68


Pulse Rate:  88


Respiratory Rate:  14


O2 Sat by Pulse Oximetry:  100


Temperature (Fahrenheit):  97.7


Nausea:  No


Vomiting:  No


Complications


none


Patient Status:  awake, reacts, patent


Hydration Status:  adequate


Drug:  ancef


Given Within 1 Hr of Incision:  Yes


Time Given:  08:10











ABY GALO CRNA Jan 31, 2018 11:52

## 2018-01-31 NOTE — ANETHESIA PREOPERATIVE EVAL
Anesthesia Pre-op PMH/ROS


General


Date of Evaluation:  Jan 31, 2018


Time of Evaluation:  07:35


Anesthesiologist:  orlando


ASA Score:  ASA 3


Mallampati Score


Class I : Soft palate, uvula, fauces, pillars visible


Class II: Soft palate, uvula, fauces visible


Class III: Soft palate, base of uvula visible


Class IV: Only hard plate visible


Mallampati Classification:  Class III


Surgeon:  keanu


Diagnosis:  Prostate ca


Surgical Procedure:  lap prostatectomy


Anesthesia History:  none


Family History:  no anesthesia problems


Allergies:  


Coded Allergies:  


     No Known Allergies (Unverified , 2/19/14)


Medications:  see eMAR





Past Medical History


Cardiovascular:  Reports: HTN, CAD


Pulmonary:  Reports: asthma, COPD


Gastrointestinal/Genitourinary:  Denies: GERD, CRI, ESRD, other


Neurologic/Psychiatric:  Denies: dementia, CVA, depression/anxiety, TIA, other


Endocrine:  Denies: DM, hypothyroidism, steroids, other


HEENT:  Reports: cataract (L), cataract (R)


Hematology/Immune:  Denies: anemia, DVT, bleeding disorder, other


Musculoskeletal/Integumentary:  Denies: OA, RA, DJD, DDD, edema, other


PSxH Narrative:


eye surgery, hernia?





Anesthesia Pre-op Phys. Exam


Physician Exam





Last Vital Signs








  Date Time  Temp Pulse Resp B/P (MAP) Pulse Ox O2 Delivery O2 Flow Rate FiO2


 


1/31/18 06:54 98.1 68 20 154/91 96 Room Air  








Constitutional:  NAD


Neurologic:  CN 2-12 intact


Cardiovascular:  RRR - diminished


Respiratory:  CTA, other


Gastrointestinal:  S/NT/ND





Airway Exam


Mallampati Classification


3


Mallampati Score:  Class III


MO:  limited


Neck:  thick


TMD:  2fb


ROM:  limited


Teeth:  missing, other - missing upper and lower


Dentures:  no upper, no lower





Anesthesia Pre-op A/P


Labs


wnl





Studies


Pre-op Studies:  EKG - sr, CXR - no acute process





Risk Assessment & Plan


Assessment:


Turkmen speaking gentleman, translated via blue phone. albuterol treatment 

given prior to induction; COPD and barrel chested


Plan:


general





Pre-Antibiotics


Drug:  ancef


Given Within 1 Hr of Incision:  Yes


Time Given:  08:10











ABY GALO CRNA Jan 31, 2018 09:42

## 2018-01-31 NOTE — 48 HOUR POST ANESTHESIA EVAL
Post Anesthesia Evaluation


Procedure:  laparoscopic prostatectomy


Date of Evaluation:  Jan 31, 2018


Time of Evaluation:  12:47


Blood Pressure Systolic:  119


0:  67


Pulse Rate:  81


Respiratory Rate:  14


O2 Sat by Pulse Oximetry:  99


Airway:  patent


Nausea:  No


Vomiting:  No


Hydration Status:  adequate


Cardiopulmonary Status:


stable


Mental Status/LOC:  patient returned to baseline


Follow-up Care/Observations:


na


Post-Anesthesia Complications:


none


Follow-up care needed:  N/A











ABY GALO CRNA Jan 31, 2018 12:47

## 2018-01-31 NOTE — HISTORY AND PHYSICAL
History of Present Illness


General


Date patient seen:  Jan 31, 2018





Present Illness


HPI


77 year old male with hx of COPD, HTN, prostate cancer underwent prostatectomy 

today and transferred to med/surgical floor for post op care.


Allergies:  


Coded Allergies:  


     No Known Allergies (Unverified , 2/19/14)





Medication History


Scheduled


Aspirin Ec* (Aspirin Ec*), 81 MG ORAL DAILY, (Reported)


Atorvastatin Calcium* (Atorvastatin Calcium*), 20 MG ORAL BEDTIME, (Reported)


Budesonide/Formoterol Fumarate (Symbicort 160-4.5 Mcg Inhaler), 1 PUFF IH DA, (

Reported)


Clopidogrel Bisulfate* (Plavix*), 75 MG ORAL DAILY, (Reported)


Cyclosporine (Restasis), BOTH EYES BID, (Reported)


Dexlansoprazole (Dexilant), 30 MG ORAL DAILY, (Reported)


Docusate Sodium (Stool Softener), 250 MG PO DA, (Reported)


Econazole Nitrate (Econazole Nitrate), 30 GM TP DA, (Reported)


Ferrous Sulfate (Ferosul), 325 MG PO DA, (Reported)


Fluticasone/Salmeterol (Advair 250-50 Diskus), 1 PUFF INH BEDTIME, (Reported)


Furosemide* (Lasix*), 40 MG ORAL DAILY, (Reported)


Ipratropium/Albuterol Sulfate (Combivent Respimat Inhal Spray), 4 GM IH AS 

NEEDED, (Reported)


Loratadine (Loratadine), 10 MG PO DA, (Reported)


Losartan/Hydrochlorothiazide (Losartan-Hctz 50-12.5 Mg Tab), 1 TAB ORAL DAILY, (

Reported)


Mirabegron (Myrbetriq), 25 MG PO DA, (Reported)


Oseltamivir Phosphate (Tamiflu), 75 MG ORAL TWICE A DAY


Prednisone* (Prednisone*), 5 MG ORAL DAILY, (Reported)


Rosuvastatin Calcium* (Crestor*), 10 MG PO BEDTIME, (Reported)


Silodosin (Rapaflo), 8 MG ORAL DAILY, (Reported)


Solifenacin Succinate* (Vesicare*), 5 MG ORAL BEDTIME, (Reported)





Scheduled PRN


Alprazolam* (Xanax*), 0.5 ORAL DAILY PRN for Agitation, (Reported)


Metoclopramide Hcl (Metoclopramide Hcl), 10 MG ORAL Q6HR PRN for Nausea & 

Vomiting, (Reported)


Zolpidem Tartrate* (Zolpidem Tartrate*), 5 MG ORAL BEDTIME PRN for Insomnia, (

Reported)





Miscellaneous Medications


Lorazepam* (Lorazepam*), (Reported)





Discontinued Medications


Albuterol Sulfate (Albuterol Sulfate), (Reported)


   Discontinued Reason: Pt stopped taking med


Amoxicillin* (Amoxil*), 500 MG ORAL EVERY 8 HOURS


   Discontinued Reason: Pt stopped taking med


Captopril (Captopril), 25 ORAL Q6HR PRN for For High Blood Pressure, (Reported)


   Discontinued Reason: Pt stopped taking med


Esomeprazole Magnesium (Nexium), 40 MG ORAL DAILY, (Reported)


   Discontinued Reason: Pt stopped taking med





Patient History


Healthcare decision maker


DANIEL-DAUGHTER


Resuscitation status


Full Code


Advanced Directive on File








Past Medical/Surgical History


Past Medical/Surgical History:  


(1) COPD (chronic obstructive pulmonary disease)





Review of Systems


Constitutional:  Reports: no symptoms


Eye:  Reports: no symptoms


Gastrointestinal:  Reports: abdominal pain





Physical Exam


General Appearance:  WD/WN, no apparent distress


Lines, tubes and drains:  peripheral


HEENT:  normocephalic, atraumatic


Neck:  non-tender, normal alignment


Respiratory/Chest:  chest wall non-tender, lungs clear


Cardiovascular/Chest:  normal peripheral pulses, normal rate


Abdomen:  normal bowel sounds


Genitourinary/Rectal:  normal genital exam





Last 24 Hour Vital Signs








  Date Time  Temp Pulse Resp B/P (MAP) Pulse Ox O2 Delivery O2 Flow Rate FiO2


 


1/31/18 14:25  80 16 122/62 99 Nasal Cannula 3.0 


 


1/31/18 13:49 97.3       


 


1/31/18 13:30 97.8 80 18 123/63 98 Nasal Cannula 3.0 


 


1/31/18 13:19  93 12 114/64 99 Nasal Cannula 3.0 


 


1/31/18 13:00  84 15 124/81 97 Nasal Cannula 3.0 


 


1/31/18 12:47  81 14  99   


 


1/31/18 12:43  80 16 122/62 99 Nasal Cannula 3.0 


 


1/31/18 12:30  81 15 119/64 97 Nasal Cannula 3.0 


 


1/31/18 12:00  80 18 118/60 96 Nasal Cannula 3.0 


 


1/31/18 11:52  88 14  100   


 


1/31/18 11:45  81 15 133/68 96 Nasal Cannula 3.0 


 


1/31/18 11:30  82 20 117/61 99 Nasal Cannula 3.0 


 


1/31/18 11:20  86 18 129/64 97 Nasal Cannula 3.0 


 


1/31/18 11:15  88 16 127/67 98 Simple Mask 6.0 


 


1/31/18 11:00  87 15 139/63 98 Simple Mask 6.0 


 


1/31/18 10:50 97.7 94 20 133/61 98 Simple Mask 6.0 


 


1/31/18 06:54 98.1 68 20 154/91 96 Room Air  











Laboratory Tests








Test


  1/31/18


11:15


 


White Blood Count


  9.7 K/UL


(4.8-10.8)


 


Red Blood Count


  3.74 M/UL


(4.70-6.10)  L


 


Hemoglobin


  10.1 G/DL


(14.2-18.0)  L


 


Hematocrit


  29.2 %


(42.0-52.0)  L


 


Mean Corpuscular Volume


  78 FL (80-99)


L


 


Mean Corpuscular Hemoglobin


  26.9 PG


(27.0-31.0)  L


 


Mean Corpuscular Hemoglobin


Concent 34.4 G/DL


(32.0-36.0)


 


Red Cell Distribution Width


  13.6 %


(11.6-14.8)


 


Platelet Count


  152 K/UL


(150-450)


 


Mean Platelet Volume


  5.3 FL


(6.5-10.1)  L


 


Neutrophils (%) (Auto)


  75.3 %


(45.0-75.0)  H


 


Lymphocytes (%) (Auto)


  18.1 %


(20.0-45.0)  L


 


Monocytes (%) (Auto)


  5.3 %


(1.0-10.0)


 


Eosinophils (%) (Auto)


  0.8 %


(0.0-3.0)


 


Basophils (%) (Auto)


  0.6 %


(0.0-2.0)


 


Sodium Level


  141 MMOL/L


(136-145)


 


Potassium Level


  3.4 MMOL/L


(3.5-5.1)  L


 


Chloride Level


  109 MMOL/L


()  H


 


Carbon Dioxide Level


  26 MMOL/L


(21-32)


 


Anion Gap


  6 mmol/L


(5-15)


 


Blood Urea Nitrogen


  18 mg/dL


(7-18)


 


Creatinine


  1.4 MG/DL


(0.55-1.30)  H


 


Estimat Glomerular Filtration


Rate  mL/min (>60)  


 


 


Glucose Level


  124 MG/DL


()  H


 


Calcium Level


  7.6 MG/DL


(8.5-10.1)  L








Height (Feet):  5


Height (Inches):  11.00


Weight (Pounds):  196


Medications





Current Medications








 Medications


  (Trade)  Dose


 Ordered  Sig/Kyara


 Route


 PRN Reason  Start Time


 Stop Time Status Last Admin


Dose Admin


 


 Acetaminophen


  (Tylenol)  650 mg  Q4H  PRN


 ORAL


 T>100.5  1/31/18 14:00


 3/2/18 13:59   


 


 


 Acetaminophen


  (Tylenol)  650 mg  Q6H  PRN


 ORAL


 Mild Pain (Pain Scale 1-3)  1/31/18 14:00


 3/2/18 13:59   


 


 


 Acetaminophen/


 Hydrocodone Bitart


  (Norco 5/325)  1 tab  Q4H  PRN


 ORAL


 Moderate Pain (Pain Scale 4-6)  1/31/18 14:00


 2/7/18 13:59   


 


 


 Cefazolin Sodium


 2 gm/Sodium


 Chloride  110 ml @ 


 220 mls/hr  Q8H


 IV


   1/31/18 16:00


 2/1/18 00:29  1/31/18 16:33


 


 


 Dextrose/


 Electrolytes  1,000 ml @ 


 100 mls/hr  Q10H


 IV


   1/31/18 15:00


 3/2/18 14:59  1/31/18 15:01


 


 


 Hydromorphone HCl


  (Dilaudid)  1 mg  Q3H  PRN


 IVP


 Severe Pain (Pain Scale 7-10)  1/31/18 14:00


 2/7/18 13:59   


 


 


 Ondansetron HCl


  (Zofran)  4 mg  Q6H  PRN


 IVP


 Nausea & Vomiting  1/31/18 14:00


 3/2/18 13:59   


 


 


 Temazepam


  (Restoril)  7.5 mg  HSPRN  PRN


 ORAL


 Insomnia  1/31/18 21:00


 2/7/18 20:59   


 











Assessment/Plan


Problem List:  


(1) S/P prostatectomy


ICD Codes:  Z90.79 - Acquired absence of other genital organ(s)


SNOMED:  13611632, [id number], [id number]


(2) COPD (chronic obstructive pulmonary disease)


ICD Codes:  J44.9 - Chronic obstructive pulmonary disease, unspecified


SNOMED:  83470529


Assessment/Plan


pain management


respiratory treatment


keep gordon


check electrolytes.











JOHN MARTINEZ Jan 31, 2018 16:46

## 2018-01-31 NOTE — PRE-PROCEDURE NOTE/ATTESTATION
Pre-Procedure Note/Attestation


Complete Prior to Procedure


Planned Procedure:  not applicable


Procedure Narrative:


laparoscopic radical prostatectomy





Indications for Procedure


Pre-Operative Diagnosis:


prostate cancer





Attestation


I attest that I discussed the nature of the procedure; its benefits; risks and 

complications; and alternatives (and the risks and benefits of such alternatives

), prior to the procedure, with the patient (or the patient's legal 

representative).





I attest that, if there was a reasonable possibility of needing a blood 

transfusion, the patient (or the patient's legal representative) was given the 

[facility] standardized written summary, pursuant 

to the [place] Blood Safety Act ([place] Health and Safety Code # 1645, as 

amended).





I attest that I re-evaluated the patient just prior to the surgery and that 

there has been no change in the patient's H&P, except as documented below:











Harvinder Manzanares MD Jan 31, 2018 07:54

## 2018-02-01 VITALS — DIASTOLIC BLOOD PRESSURE: 56 MMHG | SYSTOLIC BLOOD PRESSURE: 119 MMHG

## 2018-02-01 VITALS — SYSTOLIC BLOOD PRESSURE: 136 MMHG | DIASTOLIC BLOOD PRESSURE: 63 MMHG

## 2018-02-01 VITALS — DIASTOLIC BLOOD PRESSURE: 59 MMHG | SYSTOLIC BLOOD PRESSURE: 131 MMHG

## 2018-02-01 VITALS — SYSTOLIC BLOOD PRESSURE: 134 MMHG | DIASTOLIC BLOOD PRESSURE: 56 MMHG

## 2018-02-01 VITALS — DIASTOLIC BLOOD PRESSURE: 56 MMHG | SYSTOLIC BLOOD PRESSURE: 124 MMHG

## 2018-02-01 VITALS — DIASTOLIC BLOOD PRESSURE: 71 MMHG | SYSTOLIC BLOOD PRESSURE: 133 MMHG

## 2018-02-01 LAB
ADD MANUAL DIFF: NO
ALBUMIN SERPL-MCNC: 2.7 G/DL (ref 3.4–5)
ALBUMIN/GLOB SERPL: 1.1 {RATIO} (ref 1–2.7)
ALP SERPL-CCNC: 54 U/L (ref 46–116)
ALT SERPL-CCNC: 15 U/L (ref 12–78)
ANION GAP SERPL CALC-SCNC: 5 MMOL/L (ref 5–15)
APTT BLD: 28 SEC (ref 23–33)
AST SERPL-CCNC: 15 U/L (ref 15–37)
BASOPHILS NFR BLD AUTO: 0.4 % (ref 0–2)
BILIRUB SERPL-MCNC: 0.5 MG/DL (ref 0.2–1)
BUN SERPL-MCNC: 17 MG/DL (ref 7–18)
CALCIUM SERPL-MCNC: 7.8 MG/DL (ref 8.5–10.1)
CHLORIDE SERPL-SCNC: 106 MMOL/L (ref 98–107)
CO2 SERPL-SCNC: 28 MMOL/L (ref 21–32)
CREAT SERPL-MCNC: 1.3 MG/DL (ref 0.55–1.3)
EOSINOPHIL NFR BLD AUTO: 0.8 % (ref 0–3)
ERYTHROCYTE [DISTWIDTH] IN BLOOD BY AUTOMATED COUNT: 12.9 % (ref 11.6–14.8)
GLOBULIN SER-MCNC: 2.5 G/DL
HCT VFR BLD CALC: 28.5 % (ref 42–52)
HGB BLD-MCNC: 9.1 G/DL (ref 14.2–18)
INR PPP: 1 (ref 0.9–1.1)
LYMPHOCYTES NFR BLD AUTO: 14.2 % (ref 20–45)
MCV RBC AUTO: 82 FL (ref 80–99)
MONOCYTES NFR BLD AUTO: 8.1 % (ref 1–10)
NEUTROPHILS NFR BLD AUTO: 76.5 % (ref 45–75)
PHOSPHATE SERPL-MCNC: 3.1 MG/DL (ref 2.5–4.9)
PLATELET # BLD: 133 K/UL (ref 150–450)
POTASSIUM SERPL-SCNC: 4.2 MMOL/L (ref 3.5–5.1)
RBC # BLD AUTO: 3.48 M/UL (ref 4.7–6.1)
SODIUM SERPL-SCNC: 139 MMOL/L (ref 136–145)
WBC # BLD AUTO: 7.1 K/UL (ref 4.8–10.8)

## 2018-02-01 RX ADMIN — DEXTROSE, SODIUM CHLORIDE, AND POTASSIUM CHLORIDE SCH MLS/HR: 5; .45; .15 INJECTION INTRAVENOUS at 00:51

## 2018-02-01 RX ADMIN — SODIUM CHLORIDE SCH MLS/HR: 0.9 INJECTION INTRAVENOUS at 00:51

## 2018-02-01 RX ADMIN — ATORVASTATIN CALCIUM SCH MG: 20 TABLET, FILM COATED ORAL at 20:50

## 2018-02-01 RX ADMIN — LOSARTAN POTASSIUM AND HYDROCHLOROTHIAZIDE SCH TAB: 12.5; 5 TABLET ORAL at 08:35

## 2018-02-01 RX ADMIN — POLYVINYL ALCOHOL PRN DROP: 14 SOLUTION/ DROPS OPHTHALMIC at 14:32

## 2018-02-01 RX ADMIN — POLYVINYL ALCOHOL PRN DROP: 14 SOLUTION/ DROPS OPHTHALMIC at 08:38

## 2018-02-01 RX ADMIN — HYDROCODONE BITARTRATE AND ACETAMINOPHEN PRN TAB: 5; 325 TABLET ORAL at 14:33

## 2018-02-01 RX ADMIN — POLYVINYL ALCOHOL PRN DROP: 14 SOLUTION/ DROPS OPHTHALMIC at 10:57

## 2018-02-01 RX ADMIN — POLYVINYL ALCOHOL PRN DROP: 14 SOLUTION/ DROPS OPHTHALMIC at 18:07

## 2018-02-01 RX ADMIN — DEXTROSE, SODIUM CHLORIDE, AND POTASSIUM CHLORIDE SCH MLS/HR: 5; .45; .15 INJECTION INTRAVENOUS at 10:59

## 2018-02-01 RX ADMIN — HYDROCODONE BITARTRATE AND ACETAMINOPHEN PRN TAB: 5; 325 TABLET ORAL at 23:26

## 2018-02-01 RX ADMIN — DEXTROSE, SODIUM CHLORIDE, AND POTASSIUM CHLORIDE SCH MLS/HR: 5; .45; .15 INJECTION INTRAVENOUS at 20:50

## 2018-02-01 NOTE — 48 HOUR POST ANESTHESIA EVAL
Post Anesthesia Evaluation


Procedure:  laparoscopic prostatectomy


Date of Evaluation:  Feb 1, 2018


Time of Evaluation:  12:05


Blood Pressure Systolic:  134


0:  72


Pulse Rate:  68


Respiratory Rate:  20


Temperature (Fahrenheit):  97.6


O2 Sat by Pulse Oximetry:  98


Airway:  patent


Nausea:  No


Vomiting:  No


Pain Intensity:  3


Hydration Status:  adequate


Cardiopulmonary Status:


stable


Mental Status/LOC:  patient returned to baseline


Follow-up Care/Observations:


n/a


Post-Anesthesia Complications:


In immediate postoperative period patient complained on blurry vision both eyes 

at present moment his vision is clear, there is no scleral edema or irritation, 

otherwise no postanesthesia complications noted.


Follow-up care needed:  N/A











AYAZ KEY M.D. Feb 1, 2018 12:09

## 2018-02-02 VITALS — SYSTOLIC BLOOD PRESSURE: 127 MMHG | DIASTOLIC BLOOD PRESSURE: 63 MMHG

## 2018-02-02 VITALS — SYSTOLIC BLOOD PRESSURE: 114 MMHG | DIASTOLIC BLOOD PRESSURE: 58 MMHG

## 2018-02-02 VITALS — SYSTOLIC BLOOD PRESSURE: 116 MMHG | DIASTOLIC BLOOD PRESSURE: 52 MMHG

## 2018-02-02 VITALS — SYSTOLIC BLOOD PRESSURE: 125 MMHG | DIASTOLIC BLOOD PRESSURE: 60 MMHG

## 2018-02-02 VITALS — SYSTOLIC BLOOD PRESSURE: 120 MMHG | DIASTOLIC BLOOD PRESSURE: 54 MMHG

## 2018-02-02 LAB
ADD MANUAL DIFF: NO
ANION GAP SERPL CALC-SCNC: 6 MMOL/L (ref 5–15)
BASOPHILS NFR BLD AUTO: 0.5 % (ref 0–2)
BUN SERPL-MCNC: 8 MG/DL (ref 7–18)
CALCIUM SERPL-MCNC: 8.2 MG/DL (ref 8.5–10.1)
CHLORIDE SERPL-SCNC: 105 MMOL/L (ref 98–107)
CO2 SERPL-SCNC: 28 MMOL/L (ref 21–32)
CREAT SERPL-MCNC: 1.2 MG/DL (ref 0.55–1.3)
EOSINOPHIL NFR BLD AUTO: 1.2 % (ref 0–3)
ERYTHROCYTE [DISTWIDTH] IN BLOOD BY AUTOMATED COUNT: 12.9 % (ref 11.6–14.8)
HCT VFR BLD CALC: 27.7 % (ref 42–52)
HGB BLD-MCNC: 8.9 G/DL (ref 14.2–18)
LYMPHOCYTES NFR BLD AUTO: 11.8 % (ref 20–45)
MCV RBC AUTO: 82 FL (ref 80–99)
MONOCYTES NFR BLD AUTO: 8.8 % (ref 1–10)
NEUTROPHILS NFR BLD AUTO: 77.7 % (ref 45–75)
PLATELET # BLD: 137 K/UL (ref 150–450)
POTASSIUM SERPL-SCNC: 4.1 MMOL/L (ref 3.5–5.1)
RBC # BLD AUTO: 3.36 M/UL (ref 4.7–6.1)
SODIUM SERPL-SCNC: 139 MMOL/L (ref 136–145)
WBC # BLD AUTO: 9.1 K/UL (ref 4.8–10.8)

## 2018-02-02 RX ADMIN — HYDROCODONE BITARTRATE AND ACETAMINOPHEN PRN TAB: 5; 325 TABLET ORAL at 16:16

## 2018-02-02 RX ADMIN — LOSARTAN POTASSIUM AND HYDROCHLOROTHIAZIDE SCH TAB: 12.5; 5 TABLET ORAL at 08:52

## 2018-02-02 RX ADMIN — DEXTROSE, SODIUM CHLORIDE, AND POTASSIUM CHLORIDE SCH MLS/HR: 5; .45; .15 INJECTION INTRAVENOUS at 06:28

## 2018-02-02 NOTE — PULMONOLOGY PROGRESS NOTE
Assessment/Plan


Assessment/Plan


ASSESSMENT


prostate cancer


s/p laparoscopic radical prostatectomy   


anemia 


PAVITHRA vs CRI 


hypokalemia     





PLAN OF CARE


MS floor


IVF 


empiric abx 


CL diet   


pain management , add Simethicone for gas pain 


wound care 


monitor Early output,  irrigate prn  


IS at the bedside, encourage to amy when in the bed 


correct lytes as needed, monitor renal parameters, lytes 


PT/OT, encourage OOB as tolerated    


bowel regimen 





case discussed and evaluated by supervising physician





Subjective


Allergies:  


Coded Allergies:  


     No Known Allergies (Unverified , 2/19/14)


Subjective


complains of gas pain and abdominal discomfort


tolerates CL diet


no flatus no BM yet , distended





Objective





Last 24 Hour Vital Signs








  Date Time  Temp Pulse Resp B/P (MAP) Pulse Ox O2 Delivery O2 Flow Rate FiO2


 


2/2/18 04:00 97.9 72 19 127/63 97   


 


2/2/18 00:00 98.9 75 19 120/54 97   


 


2/2/18 00:00     99 Room Air  


 


2/1/18 20:00     99 Room Air  


 


2/1/18 20:00 99.8 75 19 133/71 99   


 


2/1/18 16:00 98.7 72 19 119/56 99   


 


2/1/18 12:09  68 20  98   


 


2/1/18 12:00 100.3 74 19 124/56 98   


 


2/1/18 08:35    136/63    


 


2/1/18 08:00 98.9 75 19 131/59 99   


 


2/1/18 08:00 98.9 75 19 131/59 98   

















Intake and Output  


 


 2/1/18 2/2/18





 19:00 07:00


 


Intake Total 1480 ml 480 ml


 


Output Total 2200 ml 1600 ml


 


Balance -720 ml -1120 ml


 


  


 


Intake Oral 480 ml 480 ml


 


IV Total 1000 ml 


 


Output Urine Total 2200 ml 1600 ml


 


# Voids 2 








HEENT:  normocephalic, atraumatic, anicteric, mucous membranes moist


Respiratory/Chest:  lungs clear, no respiratory distress, no accessory muscle 

use


Cardiovascular:  normal peripheral pulses, normal rate, no JVD


Abdomen:  other - abdomen obese, with mild distention, hypooactive BS , mild 

diffuse tenderness  around  surgical sites


Genitourinary:  other - Early wth clear yellow urine


Extremities:  no edema, pedal pulses normal


Skin:  other - small surgical entries with dressings C/D/I


Neurologic/Psychiatric:  no motor/sensory deficits, alert, oriented x 3, 

responsive, normal mood/affect


Laboratory Tests


2/1/18 10:55: 


White Blood Count 7.1, Red Blood Count 3.48L, Hemoglobin 9.1L, Hematocrit 28.5L

, Mean Corpuscular Volume 82, Mean Corpuscular Hemoglobin 26.2L, Mean 

Corpuscular Hemoglobin Concent 32.0, Red Cell Distribution Width 12.9, Platelet 

Count 133L, Mean Platelet Volume 5.2L, Neutrophils (%) (Auto) 76.5H, 

Lymphocytes (%) (Auto) 14.2L, Monocytes (%) (Auto) 8.1, Eosinophils (%) (Auto) 

0.8, Basophils (%) (Auto) 0.4





Current Medications








 Medications


  (Trade)  Dose


 Ordered  Sig/Kyara


 Route


 PRN Reason  Start Time


 Stop Time Status Last Admin


Dose Admin


 


 Acetaminophen


  (Tylenol)  650 mg  Q4H  PRN


 ORAL


 T>100.5  1/31/18 14:00


 3/2/18 13:59   


 


 


 Acetaminophen


  (Tylenol)  650 mg  Q6H  PRN


 ORAL


 Mild Pain (Pain Scale 1-3)  1/31/18 14:00


 3/2/18 13:59   


 


 


 Acetaminophen/


 Hydrocodone Bitart


  (Norco 5/325)  1 tab  Q4H  PRN


 ORAL


 Moderate Pain (Pain Scale 4-6)  1/31/18 14:00


 2/7/18 13:59  2/1/18 23:26


 


 


 Alprazolam


  (Xanax)  1 mg  DAILYPRN  PRN


 ORAL


 Agitation  1/31/18 17:00


 2/7/18 16:59   


 


 


 Artificial Tears


  (Akwa-Tears)  2 drop  Q2H  PRN


 RIGHT EYE


 DRY EYE  1/31/18 18:00


 3/2/18 17:59  2/1/18 18:07


 


 


 Atorvastatin


 Calcium


  (Lipitor)  20 mg  BEDTIME


 ORAL


   1/31/18 21:00


 3/2/18 20:59  2/1/18 20:50


 


 


 Dextrose/


 Electrolytes  1,000 ml @ 


 100 mls/hr  Q10H


 IV


   1/31/18 15:00


 3/2/18 14:59  2/2/18 06:28


 


 


 HCTZ/Losartan


 Potassium


  (Hyzaar 50-12.5)  1 tab  DAILY


 ORAL


   2/1/18 09:00


 3/3/18 08:59  2/1/18 08:35


 


 


 Hydromorphone HCl


  (Dilaudid)  1 mg  Q3H  PRN


 IVP


 Severe Pain (Pain Scale 7-10)  1/31/18 14:00


 2/7/18 13:59  1/31/18 16:48


 


 


 Ondansetron HCl


  (Zofran)  4 mg  Q6H  PRN


 IVP


 Nausea & Vomiting  1/31/18 14:00


 3/2/18 13:59   


 


 


 Temazepam


  (Restoril)  7.5 mg  HSPRN  PRN


 ORAL


 Insomnia  1/31/18 21:00


 2/7/18 20:59  2/1/18 01:08


 

















García ClearyKaleida HealthCassandra Almonte NP Feb 2, 2018 07:23

## 2018-02-02 NOTE — OPERATIVE NOTE - DICTATED
DATE OF OPERATION:  01/31/2018



PREOPERATIVE DIAGNOSIS:  Multifocal prostate cancer.



POSTOPERATIVE DIAGNOSIS:  Multifocal prostate cancer.



OPERATION:  Laparoscopic radical prostatectomy.



OPERATED BY:  Harvinder Manzanares M.D.



ASSISTANT:  Cl Henning M.D.



FINDINGS:  Enlarged prostate with large median lobe.



INDICATIONS FOR SURGERY:  The patient had a [place] 7 prostate cancer and

BPH as well.  He has severe obstructing voiding symptoms.  He was taking

different medications for BPH.  Treatment options were explained to him in

great length including all potential complications.  He agreed to have

laparoscopic prostatectomy and signed a consent.  He was brought to the

operating room, placed in the supine position, and prepped and draped in

standard fashion under general anesthesia.  Veress needle and then 5

trocars were placed in the standard position.  Dr. Henning started with

dissection of the bowel adhesions to the peritoneum freeing up

retrovesical space.  After that, the retroperitoneum was opened and

seminal vesicles and vas deferens was dissected free.  After that,

dissection was carried anteriorly  bladder from the pubis.

Endopelvic fascia was opened with Harmonic Scalpel and Endo-GINI was used

for dorsal venous complex.  Bladder neck sparing technique was used to

separate the bladder from the prostate and bladder neck dissection was

well established and bladder neck was saved.  After that, prostate was

removed preserving both neurovascular bundles and the apical tissue.

After removal of the prostate, anastomosis was created between the urethra

and the bladder neck using running 2-0 Vicryl sutures.  The patient

tolerated procedure well.  Prostate was removed.  Sponge count and

instrument count was correct.  Surgicel and FloSeal was placed into the

prostatic fossa.  Of note, there is acute bleeding with estimated blood

loss of approximately 500 mL.  Bladder was irrigated.  No evidence of

leaks.  Wound was closed in a sequential fashion.  Again, sponge count and

instrument count was correct.









  ______________________________________________

  Harvinder Manzanares M.D.





DR:  WOODY

D:  01/31/2018 11:18

T:  02/01/2018 21:40

JOB#:  [id number]

CC:

## 2018-02-05 NOTE — DISCHARGE SUMMARY
Discharge Summary


Hospital Course


Date of Admission


Jan 31, 2018 at 05:50


Date of Discharge


Feb 2, 2018 at 20:12


Admitting Diagnosis





HPI


Jose M Andino is a 77 year old male who was admitted on Jan 31, 2018 at 05:

48 for Prostate Cancer


Hospital Course


dc summary #[id number]





Discharge Medications


Continued Medications:  


Docusate Sodium* (Colace*) 100 Mg Capsule


100 MG ORAL THREE TIMES A DAY for 10 Days, CAP





Levofloxacin (Levofloxacin*) 250 Mg Tablet


250 MG ORAL DAILY for 7 Days, #7 TAB











Discharge


Condition Upon Discharge:  stable


Discharge Disposition


Patient was discharged to Home (01) with  services


Discharge Diagnoses:  











Bravo (Bety),Cassandra REYNOSO Feb 5, 2018 12:59

## 2018-02-06 NOTE — DISCHARGE SUMMARY 2 SIG
DATE OF ADMISSION:  01/31/2018



DATE OF DISCHARGE:  02/02/2018



REASON FOR ADMISSION:    77-year-old male, with history of multifocal 

prostate cancer, presented to [facility] for

elective surgery.



 HOSPITAL COURSE:  The patient subsequently undergone laparoscopic 

radical prostatectomy on 01/31/2018.  Course of recovery was uneventful.  

The patient was started on empiric antibiotics.  The patient started on clear

liquid diet, which slowly advanced as tolerated.  Pain management was

addressed.  Simethicone was provided initially for gas pain.  Early output

was closely monitored with as needed irrigation.  Urine clear yellow color, no 
clots.

Incentive spirometer was provided.  The patient was taught and encouraged 

to use it while in the bed.  Ambulation was encouraged.

Electrolytes were corrected as needed.  Renal parameters were closely

monitored. Creatine down to 1.2 from initial - 1.4. Hypokalemia resolved 

after correction. Hemiglobin/hematocrit were closely  monitored, remained 
stable. 

Hemoglobin - 8.9 and hematocrit- 27.7, prior to discharge.

The patient was working on physical and occupational therapists,

was out of bed and ambulated by himself.  Bowel regimen was instituted.

The patient after dinnertime felt better, pain resolved.  Urine output clear, 

ambulated, tolerated diet.  The patient was stable for discharge home with 

home health services.  Prescription provided for empiric antibiotic and stool 

softener to be continued.



FINAL DIAGNOSES:

1. Prostate cancer

2. Status post laparoscopic radical prostatectomy.

2. Anemia.

3. Hypokalemia.

4. Acute kidney injury on chronic renal insufficiency.  



DISCHARGE MEDICATIONS:  See medication reconciliation list.



DISCHARGE INSTRUCTIONS:  The patient discharged home with home health

services.  Follow up with the surgeon as advised by Dr. Manzanares.







  ______________________________________________

  Harvinder Manzanares M.D.



 

  ______________________________________________

  Cassandra Dugerald N.P.





DR:  CHARLETTE

D:  02/05/2018 12:58

T:  02/06/2018 14:40

JOB#:  [id number]

CC:



SAMARA

## 2020-08-13 NOTE — INFECTIOUS DISEASES PROG NOTE
Assessment/Plan


Assessment/Plan


antibiotics : tamiflu, ceftriaxone





A


1. influenza A URI


2. streptococcus UTI


3. HTN


4. COPD





P


1. continue tamiflu 3 more days


2. continue ceftriaxone


3. will follow up cultures





Subjective


Constitutional:  Denies: chills, fever


Respiratory:  Reports: dry cough, shortness of breath


Gastrointestinal/Abdominal:  Denies: diarrhea, nausea, vomiting


Musculoskeletal:  Reports: pain


Allergies:  


Coded Allergies:  


     No Known Allergies (Unverified , 2/19/14)





Objective


Vital Signs





Last 24 Hour Vital Signs








  Date Time  Temp Pulse Resp B/P Pulse Ox O2 Delivery O2 Flow Rate FiO2


 


2/4/17 09:08    135/77    


 


2/4/17 08:00 97.0 75 20 135/77 95 Room Air  


 


2/4/17 04:30 98.0 80 20 154/78 99 Nasal Cannula 2.0 


 


2/4/17 03:59  79      


 


2/4/17 03:30  81 18  99 Nasal Cannula 3.0 


 


2/4/17 03:30  80 18  97 Nasal Cannula 3.0 


 


2/3/17 23:42  87      


 


2/3/17 23:25  84 18  99 Nasal Cannula 3.0 


 


2/3/17 23:25  89 18  96 Nasal Cannula 3.0 


 


2/3/17 20:17 98.4 95 20 138/70 97 Room Air  


 


2/3/17 20:00  107      


 


2/3/17 19:21  93 18  98 Nasal Cannula 3.0 


 


2/3/17 19:20  93 18  98 Nasal Cannula 3.0 


 


2/3/17 19:20     98 Nasal Cannula 2.0 


 


2/3/17 19:20      Nasal Cannula 2.0 


 


2/3/17 16:43 98.2 90 20 137/75 97 Nasal Cannula 3.0 


 


2/3/17 16:00  92      


 


2/3/17 15:50  94 20  97 Nasal Cannula 3.0 


 


2/3/17 15:40  91 18  95 Nasal Cannula 3.0 


 


2/3/17 11:42  97 20  96 Nasal Cannula 3.0 


 


2/3/17 11:28  94 20  92 Nasal Cannula 2.0 








Height (Feet):  5


Height (Inches):  10.00


Weight (Pounds):  200


Respiratory/Chest:  lungs clear


Cardiovascular:  normal rate, regular rhythm, no gallop/murmur


Abdomen:  soft, non tender


Extremities:  no edema





Microbiology








 Date/Time


Source Procedure


Growth Status


 


 


 2/3/17 05:15


Nasal Nares Influenza Types A,B Antigen (GUANACO) - Final Complete


 


 2/2/17 20:15


Urine,Clean Catch Urine Culture - Preliminary


Strep Species, Gamma-Hemolytic Resulted











Laboratory Tests








Test


  2/3/17


12:49


 


Arterial Blood pH


  7.390


(7.350-7.450)


 


Arterial Blood Partial


Pressure CO2 36.4 mmHg


(35.0-45.0)


 


Arterial Blood Partial


Pressure O2 76.5 mmHg


(75.0-100.0)


 


Arterial Blood HCO3


  21.9 mmol/L


(22.0-26.0)  L


 


Arterial Blood Oxygen


Saturation 94.9 %


(92.0-98.0)


 


Arterial Blood Base Excess -2.4  


 


Tray Test Positive  

















KAL CASTAÑEDA Feb 4, 2017 11:15 Complex Repair And Dorsal Nasal Flap Text: The defect edges were debeveled with a #15 scalpel blade.  The primary defect was closed partially with a complex linear closure.  Given the location of the remaining defect, shape of the defect and the proximity to free margins a dorsal nasal flap was deemed most appropriate for complete closure of the defect.  Using a sterile surgical marker, an appropriate flap was drawn incorporating the defect and placing the expected incisions within the relaxed skin tension lines where possible.    The area thus outlined was incised deep to adipose tissue with a #15 scalpel blade.  The skin margins were undermined to an appropriate distance in all directions utilizing iris scissors.